# Patient Record
Sex: MALE | Race: WHITE | NOT HISPANIC OR LATINO | Employment: FULL TIME | URBAN - METROPOLITAN AREA
[De-identification: names, ages, dates, MRNs, and addresses within clinical notes are randomized per-mention and may not be internally consistent; named-entity substitution may affect disease eponyms.]

---

## 2018-11-07 ENCOUNTER — TRANSCRIBE ORDERS (OUTPATIENT)
Dept: ADMINISTRATIVE | Facility: HOSPITAL | Age: 33
End: 2018-11-07

## 2018-11-07 DIAGNOSIS — R22.32 MASS OF FINGER OF LEFT HAND: Primary | ICD-10-CM

## 2018-11-07 DIAGNOSIS — G56.22 LESION OF LEFT ULNAR NERVE: ICD-10-CM

## 2018-11-10 ENCOUNTER — HOSPITAL ENCOUNTER (OUTPATIENT)
Dept: ULTRASOUND IMAGING | Facility: HOSPITAL | Age: 33
Discharge: HOME | End: 2018-11-10
Attending: FAMILY MEDICINE
Payer: COMMERCIAL

## 2018-11-10 DIAGNOSIS — R22.32 MASS OF FINGER OF LEFT HAND: ICD-10-CM

## 2018-11-10 DIAGNOSIS — G56.22 LESION OF LEFT ULNAR NERVE: ICD-10-CM

## 2018-11-10 PROCEDURE — 76882 US LMTD JT/FCL EVL NVASC XTR: CPT

## 2020-08-27 ENCOUNTER — OFFICE VISIT (OUTPATIENT)
Dept: URGENT CARE | Facility: CLINIC | Age: 35
End: 2020-08-27
Payer: OTHER MISCELLANEOUS

## 2020-08-27 VITALS
HEIGHT: 71 IN | BODY MASS INDEX: 22.43 KG/M2 | RESPIRATION RATE: 16 BRPM | TEMPERATURE: 96.8 F | SYSTOLIC BLOOD PRESSURE: 108 MMHG | DIASTOLIC BLOOD PRESSURE: 78 MMHG | WEIGHT: 160.2 LBS | OXYGEN SATURATION: 96 % | HEART RATE: 75 BPM
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
## 2020-08-27 PROCEDURE — G0382 LEV 3 HOSP TYPE B ED VISIT: HCPCS | Performed by: FAMILY MEDICINE

## 2020-08-27 PROCEDURE — 99283 EMERGENCY DEPT VISIT LOW MDM: CPT | Performed by: FAMILY MEDICINE

## 2020-08-27 RX ORDER — VENLAFAXINE 25 MG/1
TABLET ORAL EVERY 12 HOURS
COMMUNITY
Start: 2017-04-05

## 2020-08-27 RX ORDER — QUETIAPINE FUMARATE 25 MG/1
TABLET, FILM COATED ORAL
COMMUNITY
Start: 2016-12-28

## 2020-08-27 RX ORDER — BUPROPION HYDROCHLORIDE 150 MG/1
TABLET, EXTENDED RELEASE ORAL
COMMUNITY
Start: 2020-06-05

## 2022-04-19 ENCOUNTER — HOSPITAL ENCOUNTER (EMERGENCY)
Facility: HOSPITAL | Age: 37
End: 2022-04-20
Attending: EMERGENCY MEDICINE | Admitting: EMERGENCY MEDICINE
Payer: COMMERCIAL

## 2022-04-19 DIAGNOSIS — F32.A DEPRESSION: Primary | ICD-10-CM

## 2022-04-19 DIAGNOSIS — R45.851 SUICIDAL IDEATION: ICD-10-CM

## 2022-04-19 LAB
AMPHETAMINES SERPL QL SCN: NEGATIVE
BARBITURATES UR QL: NEGATIVE
BENZODIAZ UR QL: NEGATIVE
COCAINE UR QL: NEGATIVE
ETHANOL EXG-MCNC: 0 MG/DL
FLUAV RNA RESP QL NAA+PROBE: NEGATIVE
FLUBV RNA RESP QL NAA+PROBE: NEGATIVE
METHADONE UR QL: NEGATIVE
OPIATES UR QL SCN: NEGATIVE
OXYCODONE+OXYMORPHONE UR QL SCN: NEGATIVE
PCP UR QL: NEGATIVE
RSV RNA RESP QL NAA+PROBE: NEGATIVE
SARS-COV-2 RNA RESP QL NAA+PROBE: NEGATIVE
THC UR QL: POSITIVE

## 2022-04-19 PROCEDURE — 82075 ASSAY OF BREATH ETHANOL: CPT

## 2022-04-19 PROCEDURE — 0241U HB NFCT DS VIR RESP RNA 4 TRGT: CPT | Performed by: EMERGENCY MEDICINE

## 2022-04-19 PROCEDURE — 80307 DRUG TEST PRSMV CHEM ANLYZR: CPT | Performed by: EMERGENCY MEDICINE

## 2022-04-19 PROCEDURE — 99284 EMERGENCY DEPT VISIT MOD MDM: CPT

## 2022-04-19 PROCEDURE — 99285 EMERGENCY DEPT VISIT HI MDM: CPT

## 2022-04-19 RX ORDER — LORAZEPAM 1 MG/1
1 TABLET ORAL ONCE
Status: COMPLETED | OUTPATIENT
Start: 2022-04-19 | End: 2022-04-19

## 2022-04-19 RX ORDER — ONDANSETRON 4 MG/1
4 TABLET, ORALLY DISINTEGRATING ORAL ONCE
Status: DISCONTINUED | OUTPATIENT
Start: 2022-04-19 | End: 2022-04-19

## 2022-04-19 RX ADMIN — LORAZEPAM 1 MG: 1 TABLET ORAL at 16:08

## 2022-04-19 RX ADMIN — NICOTINE POLACRILEX 2 MG: 2 GUM, CHEWING BUCCAL at 22:00

## 2022-04-19 RX ADMIN — NICOTINE POLACRILEX 2 MG: 2 GUM, CHEWING BUCCAL at 16:12

## 2022-04-19 RX ADMIN — NICOTINE 7 MG: 7 PATCH TRANSDERMAL at 12:48

## 2022-04-19 NOTE — ED NOTES
Crisis met with patient to complete a crisis intake and safety risk assessment  Patient is currently denying SI or a plan but reports continued and increasing thoughts of hopelessness over the last two and half months  Patient does not currently but had a history of SIB including cutting his forearm during a reported "psychotic break" when he was 23 or 20  He reports that he has lost about 30 pounds in about 5 weeks without attempting to and does not sleep more than 2 to 3 hours within a 48 hour time span  Patient appears to be in a manic phase at this time noted by being tangential, flight of ideas, and inability to sit still as he was rock back and forth throughout the assessment  Patient states that he feels as if people are "reading from scripts" and does not always have a concept to time  He has a history of substance use but has been clean since 2012  Patient has a history of being in inpatient treatment at [facility] (x2) in 2011 and 2012  Patient has many psycho stressors occurring currently and feels as if he does not have support as "no one notices me " Patient is currently connected to an OP therapist and psychiatrist at [facility] in [place]  Patient feels as if inpatient treatment is needed at this time to stabilize his mood and is agreeable to sign a 201

## 2022-04-20 VITALS
SYSTOLIC BLOOD PRESSURE: 123 MMHG | RESPIRATION RATE: 18 BRPM | HEART RATE: 73 BPM | OXYGEN SATURATION: 99 % | DIASTOLIC BLOOD PRESSURE: 80 MMHG | TEMPERATURE: 98.2 F

## 2022-04-20 PROCEDURE — 96372 THER/PROPH/DIAG INJ SC/IM: CPT

## 2022-04-20 RX ORDER — HALOPERIDOL 5 MG/ML
5 INJECTION INTRAMUSCULAR ONCE
Status: COMPLETED | OUTPATIENT
Start: 2022-04-20 | End: 2022-04-20

## 2022-04-20 RX ORDER — LORAZEPAM 1 MG/1
1 TABLET ORAL EVERY 6 HOURS PRN
Status: DISCONTINUED | OUTPATIENT
Start: 2022-04-20 | End: 2022-04-20 | Stop reason: HOSPADM

## 2022-04-20 RX ORDER — DIPHENHYDRAMINE HYDROCHLORIDE 50 MG/ML
25 INJECTION INTRAMUSCULAR; INTRAVENOUS ONCE
Status: COMPLETED | OUTPATIENT
Start: 2022-04-20 | End: 2022-04-20

## 2022-04-20 RX ADMIN — NICOTINE POLACRILEX 2 MG: 2 GUM, CHEWING BUCCAL at 00:13

## 2022-04-20 RX ADMIN — LORAZEPAM 1 MG: 1 TABLET ORAL at 08:56

## 2022-04-20 RX ADMIN — NICOTINE POLACRILEX 2 MG: 2 GUM, CHEWING BUCCAL at 06:29

## 2022-04-20 RX ADMIN — HALOPERIDOL LACTATE 5 MG: 5 INJECTION, SOLUTION INTRAMUSCULAR at 00:13

## 2022-04-20 RX ADMIN — NICOTINE POLACRILEX 2 MG: 2 GUM, CHEWING BUCCAL at 02:17

## 2022-04-20 RX ADMIN — DIPHENHYDRAMINE HYDROCHLORIDE 25 MG: 50 INJECTION INTRAMUSCULAR; INTRAVENOUS at 00:13

## 2022-04-20 RX ADMIN — NICOTINE POLACRILEX 2 MG: 2 GUM, CHEWING BUCCAL at 08:13

## 2022-04-20 NOTE — ED NOTES
Patient is accepted at [facility]  Patient is accepted by Jolie Limauth is arranged with [facility]  Transportation is scheduled for **  Patient may go to the floor at after 0800  Nurse report is to be called to ** prior to patient transfer

## 2022-04-20 NOTE — ED NOTES
Spoke with Rosalie Whitehead and Majo from [facility] covid and UDS and then can potentially accept patient  Will call back once completed

## 2022-04-20 NOTE — EMTALA/ACUTE CARE TRANSFER
[facility] EMERGENCY DEPARTMENT  [address]  [facility] [address]  Dept: [phone]      GMZJAS TRANSFER CONSENT    NAME Yoav Sandoval                                         1985                              MRN [id number]    I have been informed of my rights regarding examination, treatment, and transfer   by Dr Sarah Beth Navarrete DO    Benefits: Continuity of care    Risks: Potential for delay in receiving treatment,Potential deterioration of medical condition,Increased discomfort during transfer,Possible worsening of condition or death during transfer      Transfer Request   I acknowledge that my medical condition has been evaluated and explained to me by the emergency department physician or other qualified medical person and/or my attending physician who has recommended and offered to me further medical examination and treatment  I understand the Hospital's obligation with respect to the treatment and stabilization of my emergency medical condition  I nevertheless request to be transferred  I release the Hospital, the doctor, and any other persons caring for me from all responsibility or liability for any injury or ill effects that may result from my transfer and agree to accept all responsibility for the consequences of my choice to transfer, rather than receive stabilizing treatment at the Hospital  I understand that because the transfer is my request, my insurance may not provide reimbursement for the services  The Hospital will assist and direct me and my family in how to make arrangements for transfer, but the hospital is not liable for any fees charged by the transport service  In spite of this understanding, I refuse to consent to further medical examination and treatment which has been offered to me, and request transfer to [facility] Name, [facility] & State : Malik Chapa   I authorize the performance of emergency medical procedures and treatments upon me in both transit and upon arrival at the receiving facility  Additionally, I authorize the release of any and all medical records to the receiving facility and request they be transported with me, if possible  I authorize the performance of emergency medical procedures and treatments upon me in both transit and upon arrival at the receiving facility  Additionally, I authorize the release of any and all medical records to the receiving facility and request they be transported with me, if possible  I understand that the safest mode of transportation during a medical emergency is an ambulance and that the Hospital advocates the use of this mode of transport  Risks of traveling to the receiving facility by car, including absence of medical control, life sustaining equipment, such as oxygen, and medical personnel has been explained to me and I fully understand them  (JEREMIAS CORRECT BOX BELOW)  [  ]  I consent to the stated transfer and to be transported by ambulance/helicopter  [  ]  I consent to the stated transfer, but refuse transportation by ambulance and accept full responsibility for my transportation by car  I understand the risks of non-ambulance transfers and I exonerate the Hospital and its staff from any deterioration in my condition that results from this refusal     X___________________________________________    DATE  22  TIME________  Signature of patient or legally responsible individual signing on patient behalf           RELATIONSHIP TO PATIENT_________________________          Provider Certification    NAME Sonia Chapa                                        [facility] 1985                              MRN [id number]    A medical screening exam was performed on the above named patient  Based on the examination:    Condition Necessitating Transfer The primary encounter diagnosis was Depression  A diagnosis of Suicidal ideation was also pertinent to this visit  Patient Condition:  The patient has been stabilized such that within reasonable medical probability, no material deterioration of the patient condition or the condition of the unborn child(cy) is likely to result from the transfer    Reason for Transfer: Level of Care needed not available at this facility    Transfer Requirements: [facility]   · Space available and qualified personnel available for treatment as acknowledged by    · Agreed to accept transfer and to provide appropriate medical treatment as acknowledged by       Billy Cervantes  · Appropriate medical records of the examination and treatment of the patient are provided at the time of transfer   [facility], [address] _______  · Transfer will be performed by qualified personnel from    and appropriate transfer equipment as required, including the use of necessary and appropriate life support measures  Provider Certification: I have examined the patient and explained the following risks and benefits of being transferred/refusing transfer to the patient/family:         Based on these reasonable risks and benefits to the patient and/or the unborn child(cy), and based upon the information available at the time of the patients examination, I certify that the medical benefits reasonably to be expected from the provision of appropriate medical treatments at another medical facility outweigh the increasing risks, if any, to the individuals medical condition, and in the case of labor to the unborn child, from effecting the transfer      X____________________________________________ DATE 04/20/22        TIME_______      ORIGINAL - SEND TO MEDICAL RECORDS   COPY - SEND WITH PATIENT DURING TRANSFER

## 2022-04-20 NOTE — ED NOTES
PC to Berna CLEMENS, [phone]  Faxed lab results as requested for COVID/UDS  Asked for precert to be done but stated that with this insurance unable to do until accepted  Will CB after information has been received

## 2022-04-20 NOTE — ED NOTES
Crisis met with patient and had patient sign 201  Patient stated that he would prefer inpatient at [facility]  Crisis contacted [facility] and spoke with Feng See  They do have one male bed available  Faxed clinicals and 201 to [facility] admissions

## 2022-04-20 NOTE — ED NOTES
PC to [facility] Berna CALLOWAY, [phone]  Relayed all auth info, let her know that will call with transportation time ([facility] called earlier but have not received a time as of this note)

## 2022-04-20 NOTE — ED NOTES
Follow up with Dr Zhang/Rukhsana in 1 week for results of sleep study.   Insurance Authorization for admission:   Phone call placed to Jason WALKER  Phone number: [phone]     Spoke to Annia Valdovinos     08 days approved  Level of care: Acute Inpatient MH  Review on 4/27/22; review person will be Salena Harada, [phone]   Authorization # [id number]        EVS (Eligibility Verification System) called - [phone]    Automated system indicates: Inactive 04/20/2022

## 2022-04-20 NOTE — ED NOTES
Patient observed as anxious, additional comfort measures provided  MD made aware as well        Viv Blevins, RN  04/19/22 0892

## 2022-04-30 ENCOUNTER — APPOINTMENT (EMERGENCY)
Dept: RADIOLOGY | Facility: HOSPITAL | Age: 37
End: 2022-04-30
Payer: COMMERCIAL

## 2022-04-30 ENCOUNTER — HOSPITAL ENCOUNTER (EMERGENCY)
Facility: HOSPITAL | Age: 37
Discharge: HOME | End: 2022-04-30
Attending: EMERGENCY MEDICINE
Payer: COMMERCIAL

## 2022-04-30 ENCOUNTER — APPOINTMENT (EMERGENCY)
Dept: CT IMAGING | Facility: HOSPITAL | Age: 37
End: 2022-04-30
Payer: COMMERCIAL

## 2022-04-30 VITALS
RESPIRATION RATE: 16 BRPM | OXYGEN SATURATION: 98 % | DIASTOLIC BLOOD PRESSURE: 68 MMHG | HEART RATE: 71 BPM | TEMPERATURE: 98.7 F | SYSTOLIC BLOOD PRESSURE: 106 MMHG

## 2022-04-30 DIAGNOSIS — R20.2 PARESTHESIAS: Primary | ICD-10-CM

## 2022-04-30 DIAGNOSIS — S39.012A BACK STRAIN, INITIAL ENCOUNTER: ICD-10-CM

## 2022-04-30 LAB — GLUCOSE SERPL-MCNC: 98 MG/DL (ref 65–140)

## 2022-04-30 PROCEDURE — 99285 EMERGENCY DEPT VISIT HI MDM: CPT

## 2022-04-30 PROCEDURE — 99285 EMERGENCY DEPT VISIT HI MDM: CPT | Performed by: EMERGENCY MEDICINE

## 2022-04-30 PROCEDURE — 93005 ELECTROCARDIOGRAM TRACING: CPT

## 2022-04-30 PROCEDURE — 82948 REAGENT STRIP/BLOOD GLUCOSE: CPT

## 2022-04-30 PROCEDURE — 70450 CT HEAD/BRAIN W/O DYE: CPT

## 2022-04-30 PROCEDURE — G1004 CDSM NDSC: HCPCS

## 2022-04-30 PROCEDURE — 72070 X-RAY EXAM THORAC SPINE 2VWS: CPT

## 2022-04-30 RX ORDER — LORAZEPAM 1 MG/1
1 TABLET ORAL ONCE
Status: COMPLETED | OUTPATIENT
Start: 2022-04-30 | End: 2022-04-30

## 2022-04-30 RX ADMIN — LORAZEPAM 1 MG: 1 TABLET ORAL at 02:53

## 2022-04-30 NOTE — ED PROVIDER NOTES
History  Chief Complaint   Patient presents with    Numbness     38 yo M with PMH of anxiety, depression presents to ED with neck "pressure" without trauma or injury (weeks) and numbness in entire left foot, which has since resolved  The numbness had been present for 6-12 hours PTA  No similar in past, and no other deficits or complaints  He ambulated to room without difficulty  He does heavy lifting for work, but no specific trauma he can think of  No drug use or fevers  No bowel/bladder incontinence  No steroids  No red flags  History provided by:  Patient and medical records   used: No    Neurologic Problem  Onset quality:  Gradual  Timing:  Constant  Progression:  Resolved  Chronicity:  New  Associated symptoms: myalgias    Associated symptoms: no abdominal pain, no chest pain, no congestion, no cough, no diarrhea, no ear pain, no fatigue, no fever, no headaches, no loss of consciousness, no nausea, no rash, no rhinorrhea, no shortness of breath, no sore throat, no vomiting and no wheezing        Prior to Admission Medications   Prescriptions Last Dose Informant Patient Reported? Taking? QUEtiapine (SEROquel) 25 mg tablet   Yes No   Si tablet   buPROPion (ZYBAN) 150 MG 12 hr tablet   Yes No   Sig: take 1 tablet by mouth every morning for 3 days then INCREASE to      (REFER TO PRESCRIPTION NOTES)  venlafaxine (EFFEXOR) 25 mg tablet   Yes No   Sig: Every 12 hours      Facility-Administered Medications: None       Past Medical History:   Diagnosis Date    Anxiety     Asthma     Depression        Past Surgical History:   Procedure Laterality Date    CHEST WALL RECONSTRUCTION      WISDOM TOOTH EXTRACTION         History reviewed  No pertinent family history  I have reviewed and agree with the history as documented      E-Cigarette/Vaping     E-Cigarette/Vaping Substances     Social History     Tobacco Use    Smoking status: Current Every Day Smoker     Packs/day: 3 00    Smokeless tobacco: Never Used   Substance Use Topics    Alcohol use: Never    Drug use: Yes     Types: Marijuana       Review of Systems   Constitutional: Negative for chills, diaphoresis, fatigue, fever and unexpected weight change  HENT: Negative for congestion, ear pain, rhinorrhea, sore throat, trouble swallowing and voice change  Eyes: Negative for pain and visual disturbance  Respiratory: Negative for cough, chest tightness, shortness of breath and wheezing  Cardiovascular: Negative for chest pain, palpitations and leg swelling  Gastrointestinal: Negative for abdominal pain, blood in stool, constipation, diarrhea, nausea and vomiting  Genitourinary: Negative for difficulty urinating and hematuria  Musculoskeletal: Positive for myalgias  Negative for arthralgias, back pain and neck pain  Skin: Negative for rash  Neurological: Positive for numbness  Negative for dizziness, loss of consciousness, syncope, light-headedness and headaches  Psychiatric/Behavioral: Negative for confusion and suicidal ideas  The patient is not nervous/anxious  Physical Exam  Physical Exam  Vitals and nursing note reviewed  Constitutional:       General: He is not in acute distress  Appearance: He is well-developed and underweight  He is not ill-appearing, toxic-appearing or diaphoretic  HENT:      Head: Normocephalic and atraumatic  Right Ear: External ear normal       Left Ear: External ear normal       Nose: Nose normal       Mouth/Throat:      Mouth: Mucous membranes are moist       Pharynx: No oropharyngeal exudate or posterior oropharyngeal erythema  Eyes:      General: No scleral icterus  Right eye: No discharge  Left eye: No discharge  Extraocular Movements: Extraocular movements intact  Conjunctiva/sclera: Conjunctivae normal       Pupils: Pupils are equal, round, and reactive to light  Neck:      Vascular: No JVD  Trachea: No tracheal deviation  Cardiovascular:      Rate and Rhythm: Normal rate and regular rhythm  Pulses: Normal pulses  Heart sounds: Normal heart sounds  No murmur heard  No friction rub  No gallop  Pulmonary:      Effort: Pulmonary effort is normal  No respiratory distress  Breath sounds: Normal breath sounds  No stridor  No wheezing or rales  Chest:      Chest wall: No tenderness  Abdominal:      General: Bowel sounds are normal  There is no distension  Palpations: Abdomen is soft  Tenderness: There is no abdominal tenderness  There is no guarding or rebound  Musculoskeletal:         General: No swelling or deformity  Normal range of motion  Cervical back: Normal, normal range of motion and neck supple  No rigidity or tenderness  Thoracic back: Tenderness present  No swelling, edema, deformity, signs of trauma, lacerations, spasms or bony tenderness  Normal range of motion  Scoliosis present  Lumbar back: Normal         Back:       Right lower leg: No edema  Left lower leg: No edema  Lymphadenopathy:      Cervical: No cervical adenopathy  Skin:     General: Skin is warm and dry  Capillary Refill: Capillary refill takes less than 2 seconds  Coloration: Skin is pale  Findings: No rash  Neurological:      General: No focal deficit present  Mental Status: He is alert and oriented to person, place, and time  GCS: GCS eye subscore is 4  GCS verbal subscore is 5  GCS motor subscore is 6  Cranial Nerves: Cranial nerves are intact  No cranial nerve deficit  Sensory: Sensation is intact  No sensory deficit  Motor: Motor function is intact  No weakness  Coordination: Coordination normal    Psychiatric:         Attention and Perception: Attention normal          Mood and Affect: Mood is anxious and depressed           Behavior: Behavior normal          Vital Signs  ED Triage Vitals [04/30/22 0220]   Temperature Pulse Respirations Blood Pressure SpO2   98 7 °F (37 1 °C) 71 16 106/68 98 %      Temp Source Heart Rate Source Patient Position - Orthostatic VS BP Location FiO2 (%)   Temporal Monitor Lying Right arm --      Pain Score       --           Vitals:    04/30/22 0220   BP: 106/68   Pulse: 71   Patient Position - Orthostatic VS: Lying         Visual Acuity      ED Medications  Medications   LORazepam (ATIVAN) tablet 1 mg (1 mg Oral Given 4/30/22 0253)       Diagnostic Studies  Results Reviewed     Procedure Component Value Units Date/Time    Fingerstick Glucose (POCT) [[id number]]  (Normal) Collected: 04/30/22 0216    Lab Status: Final result Updated: 04/30/22 0217     POC Glucose 98 mg/dl                  CT head without contrast   Final Result by Paloma Perkins MD (04/30 0321)      No acute intracranial hemorrhage, midline shift, or mass effect  Workstation performed: [id number]         XR spine thoracic 2 views   ED Interpretation by Valentina Encarnacion MD (04/30 8267)   Scoliosis, no other acute abnormality                   Procedures  ECG 12 Lead Documentation Only    Date/Time: 4/30/2022 4:55 AM  Performed by: Valentina Encarnacion MD  Authorized by: Valentina Encarnacion MD     Indications / Diagnosis:  Paresthesias  ECG reviewed by me, the ED Provider: yes    Patient location:  ED  Previous ECG:     Previous ECG:  Unavailable    Comparison to cardiac monitor: Yes    Interpretation:     Interpretation: non-specific    Rate:     ECG rate:  74    ECG rate assessment: normal    Rhythm:     Rhythm: sinus rhythm    Ectopy:     Ectopy: none    QRS:     QRS axis:  Right    QRS intervals:  Normal  Conduction:     Conduction: normal    ST segments:     ST segments:  Normal  T waves:     T waves: non-specific               ED Course                                             MDM  Number of Diagnoses or Management Options  Back strain, initial encounter: new and requires workup  Paresthesias: new and requires workup     Amount and/or Complexity of Data Reviewed  Tests in the radiology section of CPT®: ordered and reviewed  Review and summarize past medical records: yes  Independent visualization of images, tracings, or specimens: yes    Risk of Complications, Morbidity, and/or Mortality  Presenting problems: moderate  Diagnostic procedures: low  Management options: low  General comments: Pt felt improved after ativan  Neck "pressure" ongoing since sleeping in a recliner for 2 months  No CP/SOB or fevers/cough  No focal neuro sx on initial or repeat exam  Ambulated into ED  Discussed supportive care for likely muscle strain in neck/back, and PCP f/u for numbness  If sx change or worsen - RTED  Is going to find a ride home tonight  Patient Progress  Patient progress: improved      Disposition  Final diagnoses:   Paresthesias   Back strain, initial encounter     Time reflects when diagnosis was documented in both MDM as applicable and the Disposition within this note     Time User Action Codes Description Comment    4/30/2022  3:22 AM Serg VALLES Add [R20 2] Paresthesias     4/30/2022  3:23 AM Margarette Goldberg Add [S39 012A] Back strain, initial encounter       ED Disposition     ED Disposition Condition Date/Time Comment    Discharge Stable Sat Apr 30, 2022  3:22 AM Savanna Arellano discharge to home/self care              Follow-up Information     Follow up With Specialties Details Why Contact Info Additional Vinh, DO Family Medicine Schedule an appointment as soon as possible for a visit   [address] Emergency Department Emergency Medicine  If symptoms worsen [address] [address]  [phone] [address] Emergency Department, [address] [place] [address] [address]          Discharge Medication List as of 4/30/2022  3:23 AM      CONTINUE these medications which have NOT CHANGED    Details   buPROPion (ZYBAN) 150 MG 12 hr tablet take 1 tablet by mouth every morning for 3 days then INCREASE to      (REFER TO PRESCRIPTION NOTES)  , Historical Med      QUEtiapine (SEROquel) 25 mg tablet 1 tablet, Historical Med      venlafaxine (EFFEXOR) 25 mg tablet Every 12 hours, Starting Wed 4/5/2017, Historical Med             No discharge procedures on file      PDMP Review     None          ED Provider  Electronically Signed by           Kike Gillette MD  04/30/22 5757

## 2022-04-30 NOTE — ED TRIAGE NOTES
Pt arrives ambulatory to the ED from home with multiple complaints  Pt reporting numbness to L  Foot and "neck pressure" that started ~ 12 hours ago, "swelling to bottom of R  Shoulder blade" that started a couple weeks ago, and difficulty remembering things x 2 5 months  Pt AO x 4, appears in NAD

## 2022-05-03 LAB
ATRIAL RATE: 74 BPM
P AXIS: 81 DEGREES
PR INTERVAL: 144 MS
QRS AXIS: 92 DEGREES
QRSD INTERVAL: 92 MS
QT INTERVAL: 400 MS
QTC INTERVAL: 444 MS
T WAVE AXIS: 61 DEGREES
VENTRICULAR RATE: 74 BPM

## 2022-05-03 PROCEDURE — 93010 ELECTROCARDIOGRAM REPORT: CPT | Performed by: INTERNAL MEDICINE

## 2023-01-18 ENCOUNTER — HOSPITAL ENCOUNTER (EMERGENCY)
Facility: HOSPITAL | Age: 38
Discharge: HOME | End: 2023-01-19

## 2023-01-18 LAB
ALBUMIN SERPL BCP-MCNC: 3.6 G/DL (ref 3.5–5)
ALP SERPL-CCNC: 104 U/L (ref 46–116)
ALT SERPL W P-5'-P-CCNC: 25 U/L (ref 12–78)
ANION GAP SERPL CALCULATED.3IONS-SCNC: 7 MMOL/L (ref 4–13)
AST SERPL W P-5'-P-CCNC: 16 U/L (ref 5–45)
BASOPHILS # BLD AUTO: 0.06 THOUSANDS/ÂΜL (ref 0–0.1)
BASOPHILS NFR BLD AUTO: 1 % (ref 0–1)
BILIRUB SERPL-MCNC: 0.3 MG/DL (ref 0.2–1)
BUN SERPL-MCNC: 15 MG/DL (ref 5–25)
CALCIUM SERPL-MCNC: 8.8 MG/DL (ref 8.3–10.1)
CHLORIDE SERPL-SCNC: 104 MMOL/L (ref 96–108)
CO2 SERPL-SCNC: 30 MMOL/L (ref 21–32)
CREAT SERPL-MCNC: 1.01 MG/DL (ref 0.6–1.3)
EOSINOPHIL # BLD AUTO: 0.38 THOUSAND/ÂΜL (ref 0–0.61)
EOSINOPHIL NFR BLD AUTO: 3 % (ref 0–6)
ERYTHROCYTE [DISTWIDTH] IN BLOOD BY AUTOMATED COUNT: 11.7 % (ref 11.6–15.1)
GFR SERPL CREATININE-BSD FRML MDRD: 94 ML/MIN/1.73SQ M
GLUCOSE SERPL-MCNC: 111 MG/DL (ref 65–140)
HCT VFR BLD AUTO: 39.6 % (ref 36.5–49.3)
HGB BLD-MCNC: 13.2 G/DL (ref 12–17)
IMM GRANULOCYTES # BLD AUTO: 0.03 THOUSAND/UL (ref 0–0.2)
IMM GRANULOCYTES NFR BLD AUTO: 0 % (ref 0–2)
LIPASE SERPL-CCNC: 95 U/L (ref 73–393)
LYMPHOCYTES # BLD AUTO: 2.81 THOUSANDS/ÂΜL (ref 0.6–4.47)
LYMPHOCYTES NFR BLD AUTO: 23 % (ref 14–44)
MCH RBC QN AUTO: 31.7 PG (ref 26.8–34.3)
MCHC RBC AUTO-ENTMCNC: 33.3 G/DL (ref 31.4–37.4)
MCV RBC AUTO: 95 FL (ref 82–98)
MONOCYTES # BLD AUTO: 1.31 THOUSAND/ÂΜL (ref 0.17–1.22)
MONOCYTES NFR BLD AUTO: 11 % (ref 4–12)
NEUTROPHILS # BLD AUTO: 7.77 THOUSANDS/ÂΜL (ref 1.85–7.62)
NEUTS SEG NFR BLD AUTO: 62 % (ref 43–75)
NRBC BLD AUTO-RTO: 0 /100 WBCS
PLATELET # BLD AUTO: 280 THOUSANDS/UL (ref 149–390)
PMV BLD AUTO: 9.4 FL (ref 8.9–12.7)
POTASSIUM SERPL-SCNC: 3.9 MMOL/L (ref 3.5–5.3)
PROT SERPL-MCNC: 7.3 G/DL (ref 6.4–8.4)
RBC # BLD AUTO: 4.16 MILLION/UL (ref 3.88–5.62)
SODIUM SERPL-SCNC: 141 MMOL/L (ref 135–147)
WBC # BLD AUTO: 12.36 THOUSAND/UL (ref 4.31–10.16)

## 2023-01-18 RX ORDER — IBUPROFEN 600 MG/1
600 TABLET ORAL ONCE
Status: COMPLETED | OUTPATIENT
Start: 2023-01-18 | End: 2023-01-18

## 2023-01-18 RX ORDER — ACETAMINOPHEN 325 MG/1
650 TABLET ORAL ONCE
Status: COMPLETED | OUTPATIENT
Start: 2023-01-18 | End: 2023-01-18

## 2023-01-18 RX ADMIN — ACETAMINOPHEN 650 MG: 325 TABLET ORAL at 20:50

## 2023-01-18 RX ADMIN — IBUPROFEN 600 MG: 200 TABLET, FILM COATED ORAL at 20:50

## 2023-01-19 VITALS
DIASTOLIC BLOOD PRESSURE: 55 MMHG | RESPIRATION RATE: 16 BRPM | SYSTOLIC BLOOD PRESSURE: 109 MMHG | HEART RATE: 65 BPM | OXYGEN SATURATION: 99 %

## 2023-01-19 NOTE — ED NOTES
Pt sound asleep in waiting room, RN had to wake pt up to update VS in triage  VSS, pt states pain still 6/10  Pt updated on plan of care and back out to waiting room       Claude Park RN  01/19/23 2567

## 2023-03-20 ENCOUNTER — HOSPITAL ENCOUNTER (EMERGENCY)
Facility: HOSPITAL | Age: 38
Discharge: HOME | End: 2023-03-20
Attending: EMERGENCY MEDICINE

## 2023-03-20 ENCOUNTER — APPOINTMENT (EMERGENCY)
Dept: CT IMAGING | Facility: HOSPITAL | Age: 38
End: 2023-03-20

## 2023-03-20 VITALS
BODY MASS INDEX: 17.61 KG/M2 | DIASTOLIC BLOOD PRESSURE: 63 MMHG | TEMPERATURE: 97.6 F | OXYGEN SATURATION: 99 % | RESPIRATION RATE: 19 BRPM | WEIGHT: 124.5 LBS | SYSTOLIC BLOOD PRESSURE: 123 MMHG | HEART RATE: 82 BPM

## 2023-03-20 DIAGNOSIS — R07.89 ATYPICAL CHEST PAIN: Primary | ICD-10-CM

## 2023-03-20 DIAGNOSIS — R91.1 PULMONARY NODULE: ICD-10-CM

## 2023-03-20 LAB
ALBUMIN SERPL BCP-MCNC: 4.3 G/DL (ref 3.5–5)
ALP SERPL-CCNC: 76 U/L (ref 34–104)
ALT SERPL W P-5'-P-CCNC: 14 U/L (ref 7–52)
ANION GAP SERPL CALCULATED.3IONS-SCNC: 6 MMOL/L (ref 4–13)
AST SERPL W P-5'-P-CCNC: 16 U/L (ref 13–39)
ATRIAL RATE: 61 BPM
BACTERIA UR QL AUTO: NORMAL /HPF
BASOPHILS # BLD AUTO: 0.06 THOUSANDS/ÂΜL (ref 0–0.1)
BASOPHILS NFR BLD AUTO: 1 % (ref 0–1)
BILIRUB SERPL-MCNC: 0.62 MG/DL (ref 0.2–1)
BILIRUB UR QL STRIP: NEGATIVE
BNP SERPL-MCNC: 10 PG/ML (ref 0–100)
BUN SERPL-MCNC: 19 MG/DL (ref 5–25)
CALCIUM SERPL-MCNC: 9.1 MG/DL (ref 8.4–10.2)
CARDIAC TROPONIN I PNL SERPL HS: 2 NG/L
CHLORIDE SERPL-SCNC: 105 MMOL/L (ref 96–108)
CLARITY UR: CLEAR
CO2 SERPL-SCNC: 28 MMOL/L (ref 21–32)
COLOR UR: YELLOW
CREAT SERPL-MCNC: 0.89 MG/DL (ref 0.6–1.3)
EOSINOPHIL # BLD AUTO: 0.22 THOUSAND/ÂΜL (ref 0–0.61)
EOSINOPHIL NFR BLD AUTO: 3 % (ref 0–6)
ERYTHROCYTE [DISTWIDTH] IN BLOOD BY AUTOMATED COUNT: 11.2 % (ref 11.6–15.1)
GFR SERPL CREATININE-BSD FRML MDRD: 109 ML/MIN/1.73SQ M
GLUCOSE SERPL-MCNC: 87 MG/DL (ref 65–140)
GLUCOSE UR STRIP-MCNC: NEGATIVE MG/DL
HCT VFR BLD AUTO: 39.9 % (ref 36.5–49.3)
HGB BLD-MCNC: 13.7 G/DL (ref 12–17)
HGB UR QL STRIP.AUTO: NEGATIVE
IMM GRANULOCYTES # BLD AUTO: 0.02 THOUSAND/UL (ref 0–0.2)
IMM GRANULOCYTES NFR BLD AUTO: 0 % (ref 0–2)
KETONES UR STRIP-MCNC: NEGATIVE MG/DL
LEUKOCYTE ESTERASE UR QL STRIP: NEGATIVE
LIPASE SERPL-CCNC: 22 U/L (ref 11–82)
LYMPHOCYTES # BLD AUTO: 2.61 THOUSANDS/ÂΜL (ref 0.6–4.47)
LYMPHOCYTES NFR BLD AUTO: 39 % (ref 14–44)
MCH RBC QN AUTO: 31.4 PG (ref 26.8–34.3)
MCHC RBC AUTO-ENTMCNC: 34.3 G/DL (ref 31.4–37.4)
MCV RBC AUTO: 91 FL (ref 82–98)
MONOCYTES # BLD AUTO: 0.74 THOUSAND/ÂΜL (ref 0.17–1.22)
MONOCYTES NFR BLD AUTO: 11 % (ref 4–12)
NEUTROPHILS # BLD AUTO: 3.03 THOUSANDS/ÂΜL (ref 1.85–7.62)
NEUTS SEG NFR BLD AUTO: 46 % (ref 43–75)
NITRITE UR QL STRIP: NEGATIVE
NON-SQ EPI CELLS URNS QL MICRO: NORMAL /HPF
NRBC BLD AUTO-RTO: 0 /100 WBCS
P AXIS: 81 DEGREES
PH UR STRIP.AUTO: 6.5 [PH]
PLATELET # BLD AUTO: 285 THOUSANDS/UL (ref 149–390)
PMV BLD AUTO: 9.7 FL (ref 8.9–12.7)
POTASSIUM SERPL-SCNC: 3.6 MMOL/L (ref 3.5–5.3)
PR INTERVAL: 146 MS
PROT SERPL-MCNC: 6.9 G/DL (ref 6.4–8.4)
PROT UR STRIP-MCNC: ABNORMAL MG/DL
QRS AXIS: 93 DEGREES
QRSD INTERVAL: 94 MS
QT INTERVAL: 376 MS
QTC INTERVAL: 378 MS
RBC # BLD AUTO: 4.37 MILLION/UL (ref 3.88–5.62)
RBC #/AREA URNS AUTO: NORMAL /HPF
SODIUM SERPL-SCNC: 139 MMOL/L (ref 135–147)
SP GR UR STRIP.AUTO: 1.01 (ref 1–1.03)
T WAVE AXIS: 70 DEGREES
UROBILINOGEN UR STRIP-ACNC: <2 MG/DL
VENTRICULAR RATE: 61 BPM
WBC # BLD AUTO: 6.68 THOUSAND/UL (ref 4.31–10.16)
WBC #/AREA URNS AUTO: NORMAL /HPF

## 2023-03-20 RX ADMIN — SODIUM CHLORIDE 1000 ML: 0.9 INJECTION, SOLUTION INTRAVENOUS at 09:58

## 2023-03-20 RX ADMIN — IOHEXOL 100 ML: 350 INJECTION, SOLUTION INTRAVENOUS at 11:07

## 2023-03-20 NOTE — ED PROVIDER NOTES
History  Chief Complaint   Patient presents with   • Chest Pain     Pt to er with upper left chest pain that has been going on for a couple days  States he was just in the hospital the other day for this, and that he was waiting to hear from a cardiologist  Was told that "something is wrong with his heart and abdomen"     This is a 26-year-old male who states that he has had left-sided chest pain for at least 1 year  Has been seen at numerous facilities  Patient states that he recently had a CAT scan of his chest for a pulmonary nodule and was told at that time that there were some kind of blockage in his abdomen and he was supposed to follow-up with a cardiologist   He is a smoker denies any current treatment for hypertension diabetes or hypercholesterolemia did have reconstructive chest surgery at age 12 for pectus excavatum  Villegas Stephanie History provided by:  Patient  Medical Problem  Location:  Left chest  Quality:  Achy pain  Severity:  Moderate  Onset quality:  Unable to specify  Duration:  12 months  Progression:  Waxing and waning  Chronicity:  Chronic  Context:  Chronic recurrent left chest pain  Worsened by:  Palpation  Associated symptoms: abdominal pain and chest pain        Prior to Admission Medications   Prescriptions Last Dose Informant Patient Reported? Taking? QUEtiapine (SEROquel) 25 mg tablet   Yes No   Si tablet   buPROPion (ZYBAN) 150 MG 12 hr tablet   Yes No   Sig: take 1 tablet by mouth every morning for 3 days then INCREASE to      (REFER TO PRESCRIPTION NOTES)  venlafaxine (EFFEXOR) 25 mg tablet   Yes No   Sig: Every 12 hours      Facility-Administered Medications: None       Past Medical History:   Diagnosis Date   • Anxiety    • Asthma    • Depression        Past Surgical History:   Procedure Laterality Date   • CHEST WALL RECONSTRUCTION     • WISDOM TOOTH EXTRACTION         History reviewed  No pertinent family history    I have reviewed and agree with the history as documented  E-Cigarette/Vaping   • E-Cigarette Use Never User      E-Cigarette/Vaping Substances     Social History     Tobacco Use   • Smoking status: Every Day     Packs/day: 1 00     Types: Cigarettes   • Smokeless tobacco: Never   Vaping Use   • Vaping Use: Never used   Substance Use Topics   • Alcohol use: Never   • Drug use: Yes     Types: Marijuana       Review of Systems   Cardiovascular: Positive for chest pain  Gastrointestinal: Positive for abdominal pain  All other systems reviewed and are negative  Physical Exam  Physical Exam  Vitals and nursing note reviewed  Constitutional:       General: He is not in acute distress  Appearance: He is not ill-appearing, toxic-appearing or diaphoretic  Comments: Frail and thin   HENT:      Head: Normocephalic  Right Ear: Tympanic membrane, ear canal and external ear normal       Left Ear: Tympanic membrane, ear canal and external ear normal    Eyes:      General: No scleral icterus  Right eye: No discharge  Left eye: No discharge  Extraocular Movements: Extraocular movements intact  Pupils: Pupils are equal, round, and reactive to light  Cardiovascular:      Rate and Rhythm: Normal rate and regular rhythm  Pulses: Normal pulses  Heart sounds: No murmur heard  No friction rub  No gallop  Pulmonary:      Effort: No respiratory distress  Breath sounds: No stridor  No wheezing, rhonchi or rales  Comments: Pectus excavatum repair  Chest:      Chest wall: Tenderness present  Abdominal:      General: There is no distension  Palpations: Abdomen is soft  Tenderness: There is no abdominal tenderness  There is no guarding or rebound  Musculoskeletal:         General: No swelling, tenderness, deformity or signs of injury  Normal range of motion  Cervical back: Normal range of motion and neck supple  No tenderness  Right lower leg: No edema  Left lower leg: No edema  Skin:     General: Skin is warm and dry  Coloration: Skin is not jaundiced  Findings: No bruising, erythema or rash  Neurological:      General: No focal deficit present  Mental Status: He is alert and oriented to person, place, and time  Cranial Nerves: No cranial nerve deficit  Sensory: No sensory deficit  Coordination: Coordination normal       Gait: Gait normal    Psychiatric:         Mood and Affect: Mood normal          Behavior: Behavior normal          Thought Content: Thought content normal          Vital Signs  ED Triage Vitals [03/20/23 0858]   Temperature Pulse Respirations Blood Pressure SpO2   97 6 °F (36 4 °C) 100 18 98/66 99 %      Temp Source Heart Rate Source Patient Position - Orthostatic VS BP Location FiO2 (%)   Temporal Monitor Sitting Left arm --      Pain Score       --           Vitals:    03/20/23 1145 03/20/23 1200 03/20/23 1215 03/20/23 1237   BP:    123/63   Pulse: 71 74 75 77   Patient Position - Orthostatic VS:             Visual Acuity      ED Medications  Medications   sodium chloride 0 9 % bolus 1,000 mL (0 mL Intravenous Stopped 3/20/23 1232)   iohexol (OMNIPAQUE) 350 MG/ML injection (SINGLE-DOSE) 100 mL (100 mL Intravenous Given 3/20/23 1107)       Diagnostic Studies  Results Reviewed     Procedure Component Value Units Date/Time    Urine Microscopic [[id number]] Collected: 03/20/23 1237    Lab Status: In process Specimen: Urine, Clean Catch Updated: 03/20/23 1245    UA w Reflex to Microscopic w Reflex to Culture [[id number]] Collected: 03/20/23 1237    Lab Status:  In process Specimen: Urine, Clean Catch Updated: 03/20/23 1240    B-Type Natriuretic Peptide(BNP) [[id number]]  (Normal) Collected: 03/20/23 0954    Lab Status: Final result Specimen: Blood from Arm, Left Updated: 03/20/23 1111     BNP 10 pg/mL     HS Troponin 0hr (reflex protocol) [[id number]]  (Normal) Collected: 03/20/23 0954    Lab Status: Final result Specimen: Blood from Arm, Left Updated: 03/20/23 1030     hs TnI 0hr 2 ng/L     Lipase [[id number]]  (Normal) Collected: 03/20/23 0954    Lab Status: Final result Specimen: Blood from Arm, Left Updated: 03/20/23 1022     Lipase 22 u/L     Comprehensive metabolic panel [[id number]] Collected: 03/20/23 0954    Lab Status: Final result Specimen: Blood from Arm, Left Updated: 03/20/23 1022     Sodium 139 mmol/L      Potassium 3 6 mmol/L      Chloride 105 mmol/L      CO2 28 mmol/L      ANION GAP 6 mmol/L      BUN 19 mg/dL      Creatinine 0 89 mg/dL      Glucose 87 mg/dL      Calcium 9 1 mg/dL      AST 16 U/L      ALT 14 U/L      Alkaline Phosphatase 76 U/L      Total Protein 6 9 g/dL      Albumin 4 3 g/dL      Total Bilirubin 0 62 mg/dL      eGFR 109 ml/min/1 73sq m     Narrative:      [place] guidelines for Chronic Kidney Disease (CKD):   •  Stage 1 with normal or high GFR (GFR > 90 mL/min/1 73 square meters)  •  Stage 2 Mild CKD (GFR = 60-89 mL/min/1 73 square meters)  •  Stage 3A Moderate CKD (GFR = 45-59 mL/min/1 73 square meters)  •  Stage 3B Moderate CKD (GFR = 30-44 mL/min/1 73 square meters)  •  Stage 4 Severe CKD (GFR = 15-29 mL/min/1 73 square meters)  •  Stage 5 End Stage CKD (GFR <15 mL/min/1 73 square meters)  Note: GFR calculation is accurate only with a steady state creatinine    CBC and differential [[id number]]  (Abnormal) Collected: 03/20/23 0954    Lab Status: Final result Specimen: Blood from Arm, Left Updated: 03/20/23 1007     WBC 6 68 Thousand/uL      RBC 4 37 Million/uL      Hemoglobin 13 7 g/dL      Hematocrit 39 9 %      MCV 91 fL      MCH 31 4 pg      MCHC 34 3 g/dL      RDW 11 2 %      MPV 9 7 fL      Platelets 421 Thousands/uL      nRBC 0 /100 WBCs      Neutrophils Relative 46 %      Immat GRANS % 0 %      Lymphocytes Relative 39 %      Monocytes Relative 11 %      Eosinophils Relative 3 %      Basophils Relative 1 %      Neutrophils Absolute 3 03 Thousands/µL      Immature Grans Absolute 0 02 Thousand/uL      Lymphocytes Absolute 2 61 Thousands/µL      Monocytes Absolute 0 74 Thousand/µL      Eosinophils Absolute 0 22 Thousand/µL      Basophils Absolute 0 06 Thousands/µL                  CT chest abdomen pelvis w contrast   Final Result by Ruslan Cuevas MD (03/20 1213)      No acute CT findings  5 mm pulmonary nodule right lower lobe  If the patient is at increased risk for malignancy, recommend short-term follow-up in 12 months  Additional findings as above  Workstation performed: [id number]                    Procedures  ECG 12 Lead Documentation Only    Date/Time: 3/20/2023 9:52 AM  Performed by: Pearl Bryant DO  Authorized by: Pearl Bryant DO     ECG reviewed by me, the ED Provider: yes    Patient location:  ED  Rate:     ECG rate:  61  Rhythm:     Rhythm: sinus rhythm    Conduction:     Conduction: normal    T waves:     T waves: normal               ED Course             HEART Risk Score    Flowsheet Row Most Recent Value   Heart Score Risk Calculator    History 0 Filed at: 03/20/2023 1133   ECG 0 Filed at: 03/20/2023 1133   Age 0 Filed at: 03/20/2023 1133   Risk Factors 1 Filed at: 03/20/2023 1133   Troponin 0 Filed at: 03/20/2023 1133   HEART Score 1 Filed at: 03/20/2023 1133                        SBIRT 22yo+    Flowsheet Row Most Recent Value   SBIRT (23 yo +)    In order to provide better care to our patients, we are screening all of our patients for alcohol and drug use  Would it be okay to ask you these screening questions? Yes Filed at: 03/20/2023 5202   Initial Alcohol Screen: US AUDIT-C     1  How often do you have a drink containing alcohol? 0 Filed at: 03/20/2023 0949   2  How many drinks containing alcohol do you have on a typical day you are drinking? 0 Filed at: 03/20/2023 0949   3a  Male UNDER 65: How often do you have five or more drinks on one occasion? 0 Filed at: 03/20/2023 0949   3b  FEMALE Any Age, or MALE 65+:  How often do you have 4 or more drinks on one occassion? 0 Filed at: 03/20/2023 0949   Audit-C Score 0 Filed at: 03/20/2023 7385   RASHARD: How many times in the past year have you    Used an illegal drug or used a prescription medication for non-medical reasons? Never Filed at: 03/20/2023 4517                    Medical Decision Making  Chest pain differential includes acute coronary syndrome versus pneumonia pneumothorax or musculoskeletal pain also abdominal pain and questionable vascular blockage by report of patient will check CAT scan of the chest abdomen and pelvis    Amount and/or Complexity of Data Reviewed  Labs: ordered  Radiology: ordered  Disposition  Final diagnoses:   Atypical chest pain   Pulmonary nodule - Patient was informed to have repeat chest x-ray in 12 months to rule out any cancerous changes     Time reflects when diagnosis was documented in both MDM as applicable and the Disposition within this note     Time User Action Codes Description Comment    3/20/2023 12:49 PM Surekha Poe Add [R07 89] Atypical chest pain     3/20/2023 12:49 PM Darlene Hansen [R91 1] Pulmonary nodule     3/20/2023 12:49 PM Miguel A Baptiste [R91 1] Pulmonary nodule Patient was informed to have repeat chest x-ray in 12 months to rule out any cancerous changes      ED Disposition     ED Disposition   Discharge    Condition   Stable    Date/Time   Mon Mar 20, 2023 12:49 PM    Comment   Celio Ortiz discharge to home/self care  Follow-up Information     Follow up With Specialties Details Why [address], DO Family Medicine In 1 week  [facility] [address]  [phone]            Patient's Medications   Discharge Prescriptions    No medications on file       No discharge procedures on file      PDMP Review     None          ED Provider  Electronically Signed by           Karley Plascencia DO  03/20/23 3866

## 2023-03-20 NOTE — DISCHARGE INSTRUCTIONS
Have repeat CAT scan of your chest done in 12 months to further evaluate pulmonary nodule seen today to rule out any cancerous changes

## 2023-09-14 ENCOUNTER — NURSE TRIAGE (OUTPATIENT)
Age: 38
End: 2023-09-14

## 2023-09-14 NOTE — TELEPHONE ENCOUNTER
Regarding: Difficulty Urinating  ----- Message from Ricky Sanon sent at 9/14/2023  4:22 PM EDT -----  Nw Pt calling with difficulty urinating. PT states he feels there is something blocking his urine. Pt is going to [facility] 9/15/23 for Bloodwork. Decision tree says to schedule next available appt.     Pt  [phone]

## 2023-09-14 NOTE — TELEPHONE ENCOUNTER
Pt reports trouble with urination for the past 4-6 months. Also trouble with ejaculation  . Appt scheduled for next week. Pt denies blood in urine fever or chills.    Reason for Disposition  • All other males with painful urination, or patient wants to be seen    Protocols used: URINATION PAIN - MALE-ADULT-[place]

## 2023-09-22 ENCOUNTER — OFFICE VISIT (OUTPATIENT)
Dept: UROLOGY | Facility: CLINIC | Age: 38
End: 2023-09-22
Payer: COMMERCIAL

## 2023-09-22 VITALS
HEART RATE: 66 BPM | DIASTOLIC BLOOD PRESSURE: 82 MMHG | OXYGEN SATURATION: 99 % | HEIGHT: 70 IN | BODY MASS INDEX: 17.75 KG/M2 | WEIGHT: 124 LBS | SYSTOLIC BLOOD PRESSURE: 122 MMHG | RESPIRATION RATE: 18 BRPM

## 2023-09-22 DIAGNOSIS — R39.198 DIFFICULTY URINATING: Primary | ICD-10-CM

## 2023-09-22 DIAGNOSIS — N53.19 OTHER EJACULATORY DYSFUNCTION: ICD-10-CM

## 2023-09-22 DIAGNOSIS — R39.9 LOWER URINARY TRACT SYMPTOMS (LUTS): ICD-10-CM

## 2023-09-22 LAB — POST-VOID RESIDUAL VOLUME, ML POC: 35 ML

## 2023-09-22 PROCEDURE — 51798 US URINE CAPACITY MEASURE: CPT | Performed by: UROLOGY

## 2023-09-22 PROCEDURE — 99204 OFFICE O/P NEW MOD 45 MIN: CPT | Performed by: UROLOGY

## 2023-09-22 RX ORDER — CLINDAMYCIN PHOSPHATE 10 MG/ML
SOLUTION TOPICAL
COMMUNITY
Start: 2023-08-09

## 2023-09-22 RX ORDER — AMOXICILLIN AND CLAVULANATE POTASSIUM 875; 125 MG/1; MG/1
TABLET, FILM COATED ORAL
COMMUNITY
Start: 2023-08-09

## 2023-09-22 RX ORDER — HYDROCORTISONE 25 MG/G
CREAM TOPICAL
COMMUNITY
Start: 2023-08-09

## 2023-09-22 RX ORDER — ALBUTEROL SULFATE 90 UG/1
AEROSOL, METERED RESPIRATORY (INHALATION)
COMMUNITY
Start: 2023-09-12

## 2023-09-22 RX ORDER — QUETIAPINE FUMARATE 150 MG/1
TABLET, FILM COATED ORAL
COMMUNITY
Start: 2023-08-08

## 2023-09-22 RX ORDER — TAMSULOSIN HYDROCHLORIDE 0.4 MG/1
0.4 CAPSULE ORAL
COMMUNITY
Start: 2023-09-07

## 2023-09-22 RX ORDER — ATORVASTATIN CALCIUM 10 MG/1
TABLET, FILM COATED ORAL
COMMUNITY
Start: 2023-09-09

## 2023-09-22 NOTE — LETTER
September 22, 2023     Bisi Alvarez DO  [address] [address]    Patient: Edvin Dugan   YOB: 1985   Date of Visit: 9/22/2023       Dear Dr. Deepthi Lance:    Thank you for referring Edvin Dugan to me for evaluation. Below are my notes for this consultation. If you have questions, please do not hesitate to call me. I look forward to following your patient along with you. Sincerely,        Dluce Estrada DO        CC: No Recipients    Rachel Church  9/22/2023 10:40 AM  Signed  UROLOGY NEW PATIENT ENCOUNTER    Edvin Dugan is a 45 y.o. male with difficulty urinating. Pertinent PMH/PSH: previous drug/alcohol abuse (clean since around 2012)    CTCAP w/ 3/20/23: no HN BL, right renal cyst; prostate measured around 40 g    Spring 2023: Started having LUTS including hesitancy, weak stream. Denied frequency, gross hematuria, dysuria, nocturia. Started Flomax by PCP around June 2023. Did not take consistently. Not sure if it helped symptoms. Since August 2023 he noted decreased fluid ejaculation. This was associated with him starting the Flomax. PVR 35 cc on 9/22/23. Assessment and plan:     LUTS    Patient noted that for the past 6 or so months, he has noted some mild bothersome urinary symptoms. His main issue has been hesitancy. He said that when he was younger he used to get "stage fright" when urinating. He feels like this is pretty similar to that. He denied other voiding symptoms including nocturia, frequency, urgency, incontinence, dysuria, gross hematuria. His PCP prescribed him Flomax. He has not taken this every day consistently. He said that when he does take it consistently, it does help his symptoms a bit. He also came to the clinic today with concerns for decreased ejaculation over the past 1 to 2 months. He said that this did coincide with him starting the Flomax.   I explained to him that one of the most common side effects of Flomax is retrograde ejaculation, which is when the patient is able to have orgasm and ejaculate, however, the semen is not expelled Feller like he usually has. He said that he had similar issues in the past when he was on Effexor. I explained that this was a very similar mechanism of action to Flomax in terms of the bladder neck musculature, so it makes sense that he is experiencing similar side effect profile. I told him that once he stops the Flomax, he should be able to ejaculate significantly better. However, I explained that he would need to balance this with not taking Flomax for his voiding symptoms. He stated that the Flomax never improved his symptoms all that much, and he does overall have pretty minor symptoms. He stated that he would rather have normal ejaculation and slightly mild voiding symptoms then continue with Flomax and continuing to get retrograde ejaculation. We then agreed on a plan that he would stop the Flomax. I explained that in regards to his voiding symptoms, they are overall quite mild and he does demonstrate the ability to empty his bladder, with a random bladder scan of 35 cc in the office today. I provided him reassurance that even though he feels like he is not able to initiate his voids, he is voiding properly once he does start with his urination. I personally reviewed CT and explained to him that his prostate looked around 30-40 g which was somewhat enlarged for someone his age, but we would need to do cystoscopy to know for sure if prostate is really obstructing. We agreed that he can come back in 3 months. We will see if his ejaculation is improved that time now that he is off Flomax. We will also see if his voiding symptoms have continued. I explained if he is still having concerning voiding symptoms, we could consider future cystoscopy.   He said he would rather not undergo the procedure at this time, however, he does understand that the only way to definitively diagnose blockages in his lower urinary tract would be cystoscopy. Ejaculatory dysfunction    See above        PLAN  -Stop Flomax  -RTC in 3 months for symptom check  -If still having voiding symptoms at next visit, will consider cystoscopy      Portions of the above record have been created with voice recognition software. Occasional wrong word or "sound alike" substitution may have occurred due to the inherent limitations of voice recognition software. Read the chart carefully and recognize, using context, where substitution may have occurred. Breana Hodges DO        Chief Complaint     LUTS      History of Present Illness     Melanie Castro is a 45 y.o. male presenting in consultation for LUTS, ejaculatory dysfunction. The patient was referred by Dr. Justin Vaughn and today's note has been sent to the referring provider. See above summary    Denied fevers, chills, GH, dysuria        The following portions of the patient's history were reviewed and updated as appropriate: allergies, current medications, past family history, past medical history, past social history, past surgical history and problem list.            Review of Systems     Review of Systems   Constitutional: Negative for chills and fever. Respiratory: Negative for cough and shortness of breath. Gastrointestinal: Negative for abdominal pain and nausea. Genitourinary: Negative for dysuria and hematuria. Neurological: Negative for dizziness and light-headedness. Psychiatric/Behavioral: Negative for agitation and behavioral problems. Allergies     Allergies   Allergen Reactions   • Morphine      Other reaction(s): Other (See Comments)  signs consistent w/SOB       Physical Exam     Physical Exam  Constitutional:       General: He is not in acute distress. HENT:      Head: Normocephalic and atraumatic. Pulmonary:      Effort: Pulmonary effort is normal. No respiratory distress. Abdominal:      General: Abdomen is flat. Palpations: Abdomen is soft. Tenderness: There is no right CVA tenderness or left CVA tenderness. Skin:     General: Skin is warm and dry. Neurological:      Mental Status: He is alert. Psychiatric:         Mood and Affect: Mood normal.         Behavior: Behavior normal.             Vital Signs  There were no vitals filed for this visit. Current Medications       Current Outpatient Medications:   •  buPROPion (ZYBAN) 150 MG 12 hr tablet, take 1 tablet by mouth every morning for 3 days then INCREASE to . ..  (REFER TO PRESCRIPTION NOTES). , Disp: , Rfl:   •  QUEtiapine (SEROquel) 25 mg tablet, 1 tablet, Disp: , Rfl:   •  venlafaxine (EFFEXOR) 25 mg tablet, Every 12 hours, Disp: , Rfl:       Active Problems     There is no problem list on file for this patient. Past Medical History     Past Medical History:   Diagnosis Date   • Anxiety    • Asthma    • Depression          Surgical History     Past Surgical History:   Procedure Laterality Date   • CHEST WALL RECONSTRUCTION     • WISDOM TOOTH EXTRACTION           Family History     No family history on file. Social History     Social History     Social History     Tobacco Use   Smoking Status Every Day   • Packs/day: 1.00   • Types: Cigarettes   Smokeless Tobacco Never         Pertinent Lab Values     Lab Results   Component Value Date    CREATININE 0.89 03/20/2023       No results found for: "PSA"            Pertinent Imaging     The patient's images were reviewed by me personally and also in real time with them in the examination room using our PACS imaging system.       CTCAP w/ 3/20/23: no HN BL, right renal cyst; prostate measured around 40 g      Pertinent Pathology     N/A

## 2023-09-22 NOTE — PATIENT INSTRUCTIONS
Stop taking Flomax    Pay attention to your urination and check for trends and see how your symptoms are over the next few months.     See if your ejaculation improves after stopping Flomax    Come back in around 3 months

## 2023-09-22 NOTE — PROGRESS NOTES
UROLOGY NEW PATIENT ENCOUNTER    Rima Sawyer is a 45 y.o. male with difficulty urinating. Pertinent PMH/PSH: previous drug/alcohol abuse (clean since around 2012)    CTCAP w/ 3/20/23: no HN BL, right renal cyst; prostate measured around 40 g    Spring 2023: Started having LUTS including hesitancy, weak stream. Denied frequency, gross hematuria, dysuria, nocturia. Started Flomax by PCP around June 2023. Did not take consistently. Not sure if it helped symptoms. Since August 2023 he noted decreased fluid ejaculation. This was associated with him starting the Flomax. PVR 35 cc on 9/22/23. Assessment and plan:     LUTS    Patient noted that for the past 6 or so months, he has noted some mild bothersome urinary symptoms. His main issue has been hesitancy. He said that when he was younger he used to get "stage fright" when urinating. He feels like this is pretty similar to that. He denied other voiding symptoms including nocturia, frequency, urgency, incontinence, dysuria, gross hematuria. His PCP prescribed him Flomax. He has not taken this every day consistently. He said that when he does take it consistently, it does help his symptoms a bit. He also came to the clinic today with concerns for decreased ejaculation over the past 1 to 2 months. He said that this did coincide with him starting the Flomax. I explained to him that one of the most common side effects of Flomax is retrograde ejaculation, which is when the patient is able to have orgasm and ejaculate, however, the semen is not expelled Feller like he usually has. He said that he had similar issues in the past when he was on Effexor. I explained that this was a very similar mechanism of action to Flomax in terms of the bladder neck musculature, so it makes sense that he is experiencing similar side effect profile. I told him that once he stops the Flomax, he should be able to ejaculate significantly better.   However, I explained that he would need to balance this with not taking Flomax for his voiding symptoms. He stated that the Flomax never improved his symptoms all that much, and he does overall have pretty minor symptoms. He stated that he would rather have normal ejaculation and slightly mild voiding symptoms then continue with Flomax and continuing to get retrograde ejaculation. We then agreed on a plan that he would stop the Flomax. I explained that in regards to his voiding symptoms, they are overall quite mild and he does demonstrate the ability to empty his bladder, with a random bladder scan of 35 cc in the office today. I provided him reassurance that even though he feels like he is not able to initiate his voids, he is voiding properly once he does start with his urination. I personally reviewed CT and explained to him that his prostate looked around 30-40 g which was somewhat enlarged for someone his age, but we would need to do cystoscopy to know for sure if prostate is really obstructing. We agreed that he can come back in 3 months. We will see if his ejaculation is improved that time now that he is off Flomax. We will also see if his voiding symptoms have continued. I explained if he is still having concerning voiding symptoms, we could consider future cystoscopy. He said he would rather not undergo the procedure at this time, however, he does understand that the only way to definitively diagnose blockages in his lower urinary tract would be cystoscopy. Ejaculatory dysfunction    See above        PLAN  -Stop Flomax  -RTC in 3 months for symptom check  -If still having voiding symptoms at next visit, will consider cystoscopy      Portions of the above record have been created with voice recognition software. Occasional wrong word or "sound alike" substitution may have occurred due to the inherent limitations of voice recognition software.   Read the chart carefully and recognize, using context, where substitution may have occurred. Dulce Estrada DO        Chief Complaint     LUTS      History of Present Illness     Edvin Dugan is a 45 y.o. male presenting in consultation for LUTS, ejaculatory dysfunction. The patient was referred by Dr. Deepthi Lance and today's note has been sent to the referring provider. See above summary    Denied fevers, chills, GH, dysuria        The following portions of the patient's history were reviewed and updated as appropriate: allergies, current medications, past family history, past medical history, past social history, past surgical history and problem list.            Review of Systems     Review of Systems   Constitutional: Negative for chills and fever. Respiratory: Negative for cough and shortness of breath. Gastrointestinal: Negative for abdominal pain and nausea. Genitourinary: Negative for dysuria and hematuria. Neurological: Negative for dizziness and light-headedness. Psychiatric/Behavioral: Negative for agitation and behavioral problems. Allergies     Allergies   Allergen Reactions   • Morphine      Other reaction(s): Other (See Comments)  signs consistent w/SOB       Physical Exam     Physical Exam  Constitutional:       General: He is not in acute distress. HENT:      Head: Normocephalic and atraumatic. Pulmonary:      Effort: Pulmonary effort is normal. No respiratory distress. Abdominal:      General: Abdomen is flat. Palpations: Abdomen is soft. Tenderness: There is no right CVA tenderness or left CVA tenderness. Skin:     General: Skin is warm and dry. Neurological:      Mental Status: He is alert. Psychiatric:         Mood and Affect: Mood normal.         Behavior: Behavior normal.             Vital Signs  There were no vitals filed for this visit.       Current Medications       Current Outpatient Medications:   •  buPROPion (ZYBAN) 150 MG 12 hr tablet, take 1 tablet by mouth every morning for 3 days then INCREASE to . ..  (REFER TO PRESCRIPTION NOTES). , Disp: , Rfl:   •  QUEtiapine (SEROquel) 25 mg tablet, 1 tablet, Disp: , Rfl:   •  venlafaxine (EFFEXOR) 25 mg tablet, Every 12 hours, Disp: , Rfl:       Active Problems     There is no problem list on file for this patient. Past Medical History     Past Medical History:   Diagnosis Date   • Anxiety    • Asthma    • Depression          Surgical History     Past Surgical History:   Procedure Laterality Date   • CHEST WALL RECONSTRUCTION     • WISDOM TOOTH EXTRACTION           Family History     No family history on file. Social History     Social History     Social History     Tobacco Use   Smoking Status Every Day   • Packs/day: 1.00   • Types: Cigarettes   Smokeless Tobacco Never         Pertinent Lab Values     Lab Results   Component Value Date    CREATININE 0.89 03/20/2023       No results found for: "PSA"            Pertinent Imaging     The patient's images were reviewed by me personally and also in real time with them in the examination room using our PACS imaging system.       CTCAP w/ 3/20/23: no HN BL, right renal cyst; prostate measured around 40 g      Pertinent Pathology     N/A

## 2023-09-22 NOTE — LETTER
September 22, 2023     Juhi Zamarripa DO  [address] [facility] [address]    Patient: Rima Sawyer   YOB: 1985   Date of Visit: 9/22/2023       Dear Dr. Issac Goldberg:    Thank you for referring Rima Sawyer to me for evaluation. Below are my notes for this consultation. If you have questions, please do not hesitate to call me. I look forward to following your patient along with you. Sincerely,        Alfred Bernard DO        CC: No Recipients    Rachel Rodriguez  9/22/2023 10:39 AM  Sign when Signing Visit  UROLOGY NEW PATIENT ENCOUNTER    Rima Sawyer is a 45 y.o. male with difficulty urinating. Pertinent PMH/PSH: previous drug/alcohol abuse (clean since around 2012)    CTCAP w/ 3/20/23: no HN BL, right renal cyst; prostate measured around 40 g    Spring 2023: Started having LUTS including hesitancy, weak stream. Denied frequency, gross hematuria, dysuria, nocturia. Started Flomax by PCP around June 2023. Did not take consistently. Not sure if it helped symptoms. Since August 2023 he noted decreased fluid ejaculation. This was associated with him starting the Flomax. PVR 35 cc on 9/22/23. Assessment and plan:     LUTS    Patient noted that for the past 6 or so months, he has noted some mild bothersome urinary symptoms. His main issue has been hesitancy. He said that when he was younger he used to get "stage fright" when urinating. He feels like this is pretty similar to that. He denied other voiding symptoms including nocturia, frequency, urgency, incontinence, dysuria, gross hematuria. His PCP prescribed him Flomax. He has not taken this every day consistently. He said that when he does take it consistently, it does help his symptoms a bit. He also came to the clinic today with concerns for decreased ejaculation over the past 1 to 2 months. He said that this did coincide with him starting the Flomax.   I explained to him that one of the most common side effects of Flomax is retrograde ejaculation, which is when the patient is able to have orgasm and ejaculate, however, the semen is not expelled Feller like he usually has. He said that he had similar issues in the past when he was on Effexor. I explained that this was a very similar mechanism of action to Flomax in terms of the bladder neck musculature, so it makes sense that he is experiencing similar side effect profile. I told him that once he stops the Flomax, he should be able to ejaculate significantly better. However, I explained that he would need to balance this with not taking Flomax for his voiding symptoms. He stated that the Flomax never improved his symptoms all that much, and he does overall have pretty minor symptoms. He stated that he would rather have normal ejaculation and slightly mild voiding symptoms then continue with Flomax and continuing to get retrograde ejaculation. We then agreed on a plan that he would stop the Flomax. I explained that in regards to his voiding symptoms, they are overall quite mild and he does demonstrate the ability to empty his bladder, with a random bladder scan of 35 cc in the office today. I provided him reassurance that even though he feels like he is not able to initiate his voids, he is voiding properly once he does start with his urination. We agreed that he can come back in 3 months. We will see if his ejaculation is improved that time now that he is off Flomax. We will also see if his voiding symptoms have continued. I explained if he is still having concerning voiding symptoms, we could consider future cystoscopy. He said he would rather not undergo the procedure at this time, however, he does understand that the only way to definitively diagnose blockages in his lower urinary tract would be cystoscopy.     Ejaculatory dysfunction    See above        PLAN  -Stop Flomax  -RTC in 3 months for symptom check  -If still having voiding symptoms at next visit, will consider cystoscopy      Portions of the above record have been created with voice recognition software. Occasional wrong word or "sound alike" substitution may have occurred due to the inherent limitations of voice recognition software. Read the chart carefully and recognize, using context, where substitution may have occurred. Sobia Laboy DO        Chief Complaint     LUTS      History of Present Illness     Juanpablo Jovel is a 45 y.o. male presenting in consultation for LUTS, ejaculatory dysfunction. The patient was referred by Dr. Rozina Villegas and today's note has been sent to the referring provider. See above summary    Denied fevers, chills, GH, dysuria        The following portions of the patient's history were reviewed and updated as appropriate: allergies, current medications, past family history, past medical history, past social history, past surgical history and problem list.            Review of Systems     Review of Systems   Constitutional: Negative for chills and fever. Respiratory: Negative for cough and shortness of breath. Gastrointestinal: Negative for abdominal pain and nausea. Genitourinary: Negative for dysuria and hematuria. Neurological: Negative for dizziness and light-headedness. Psychiatric/Behavioral: Negative for agitation and behavioral problems. Allergies     Allergies   Allergen Reactions   • Morphine      Other reaction(s): Other (See Comments)  signs consistent w/SOB       Physical Exam     Physical Exam  Constitutional:       General: He is not in acute distress. HENT:      Head: Normocephalic and atraumatic. Pulmonary:      Effort: Pulmonary effort is normal. No respiratory distress. Abdominal:      General: Abdomen is flat. Palpations: Abdomen is soft. Tenderness: There is no right CVA tenderness or left CVA tenderness. Skin:     General: Skin is warm and dry. Neurological:      Mental Status: He is alert. Psychiatric:         Mood and Affect: Mood normal.         Behavior: Behavior normal.             Vital Signs  There were no vitals filed for this visit. Current Medications       Current Outpatient Medications:   •  buPROPion (ZYBAN) 150 MG 12 hr tablet, take 1 tablet by mouth every morning for 3 days then INCREASE to . ..  (REFER TO PRESCRIPTION NOTES). , Disp: , Rfl:   •  QUEtiapine (SEROquel) 25 mg tablet, 1 tablet, Disp: , Rfl:   •  venlafaxine (EFFEXOR) 25 mg tablet, Every 12 hours, Disp: , Rfl:       Active Problems     There is no problem list on file for this patient. Past Medical History     Past Medical History:   Diagnosis Date   • Anxiety    • Asthma    • Depression          Surgical History     Past Surgical History:   Procedure Laterality Date   • CHEST WALL RECONSTRUCTION     • WISDOM TOOTH EXTRACTION           Family History     No family history on file. Social History     Social History     Social History     Tobacco Use   Smoking Status Every Day   • Packs/day: 1.00   • Types: Cigarettes   Smokeless Tobacco Never         Pertinent Lab Values     Lab Results   Component Value Date    CREATININE 0.89 03/20/2023       No results found for: "PSA"            Pertinent Imaging     The patient's images were reviewed by me personally and also in real time with them in the examination room using our PACS imaging system.       CTCAP w/ 3/20/23: no HN BL, right renal cyst; prostate measured around 40 g      Pertinent Pathology     N/A

## 2023-10-05 ENCOUNTER — CONSULT (OUTPATIENT)
Dept: PULMONOLOGY | Facility: CLINIC | Age: 38
End: 2023-10-05
Payer: COMMERCIAL

## 2023-10-05 VITALS
HEIGHT: 70 IN | DIASTOLIC BLOOD PRESSURE: 80 MMHG | OXYGEN SATURATION: 99 % | WEIGHT: 121 LBS | BODY MASS INDEX: 17.32 KG/M2 | HEART RATE: 81 BPM | SYSTOLIC BLOOD PRESSURE: 128 MMHG

## 2023-10-05 DIAGNOSIS — R91.1 LUNG NODULE: ICD-10-CM

## 2023-10-05 DIAGNOSIS — J43.2 CENTRILOBULAR EMPHYSEMA (HCC): Primary | ICD-10-CM

## 2023-10-05 DIAGNOSIS — F17.211 NICOTINE DEPENDENCE, CIGARETTES, IN REMISSION: ICD-10-CM

## 2023-10-05 PROCEDURE — 99204 OFFICE O/P NEW MOD 45 MIN: CPT | Performed by: INTERNAL MEDICINE

## 2023-10-05 PROCEDURE — 94010 BREATHING CAPACITY TEST: CPT

## 2023-10-05 NOTE — PROGRESS NOTES
Pulmonary Consultation   Clem Santamaria 45 y.o. male MRN: [id number]    Encounter: [id number]      Reason for consultation:   Abnormal CT scan of the chest    Requesting physician:  Olena Tony DO      Impressions:   · Centrilobular emphysema. · Lung nodule. This has been stable since 2019 according to the patient. · History of tobacco use. Recommendations:  · Spirometry. · Follow-up as needed. Discussion:  The patient has centrilobular emphysema. This is secondary to the 22-pack-year smoking history. It is mild. It is not clinically significant. The patient has no airflow limitation on the spirometry. He is fairly asymptomatic. I have emphasized on the importance of remaining smoke-free. There is no indication for inhalers. The pulmonary nodule has been stable since 2019 according to the patient. No further follow-up imaging studies are indicated. I have not scheduled him for another appointment however I will be happy to see him in the future if the need arises. History of Present Illness   HPI:  Clem Santamaria is a 45 y.o. male who is here for evaluation of abnormal CT of the chest.  The patient stated that he was having CT scans of the chest done to follow a pulmonary nodule. There was another abnormality concerning for COPD. For this reason he was referred to me for further management. The patient has about 22-pack-year smoking history. He quit smoking recently    He denies cough, wheezing or sputum production. He denies shortness of breath even on exertion. Review of systems:  12 point review of systems was completed and was otherwise negative except as listed in HPI. Historical Information   Past Medical History:   Diagnosis Date   • Anxiety    • Asthma    • Depression      Past Surgical History:   Procedure Laterality Date   • CHEST WALL RECONSTRUCTION     • WISDOM TOOTH EXTRACTION       No family history on file.     Family History:  No history of chronic lung disease in his immediate family. Social History:  The patient lives by himself. He has no pets. Has 22-pack-year smoking history and quit smoking recently. He works in a factory making steel cans      Meds/Allergies   No current facility-administered medications for this visit. (Not in a hospital admission)    No Active Allergies    Vitals: Blood pressure 128/80, pulse 81, height 5' 10" (1.778 m), weight 54.9 kg (121 lb), SpO2 99 %. ,      Physical exam:        Head/eyes:    Normocephalic, without obvious abnormality, atraumatic,         PERRL, extraocular muscles intact, no scleral icterus    Nose:   Nares normal, septum midline, mucosa normal, no drainage    or sinus tenderness   Throat:   Moist mucous membranes, no thrush   Neck:   Supple, trachea midline, no adenopathy; no carotid    bruit or JVD   Lungs:    Clear breath sounds. No wheezing or rhonchi. Heart:    Regular rate and rhythm, S1 and S2 normal, no murmur, rub   or gallop   Abdomen:     Soft, non-tender, bowel sounds active all four quadrants,     no masses, no organomegaly   Extremities:   Extremities normal, atraumatic, no cyanosis or edema   Skin:   Warm, dry, turgor normal, no rashes or lesions   Neurologic:   CNII-XII intact, normal strength, non-focal             Imaging and other studies: I have personally reviewed pertinent films in PACS CT scan of the chest is reviewed on PACS system. There is centrilobular emphysema mainly in the right upper lobe. Spirometry done today in the office. No evidence of airflow limitation.     Lab Results   Component Value Date    WBC 6.68 03/20/2023    HGB 13.7 03/20/2023    HCT 39.9 03/20/2023    MCV 91 03/20/2023     03/20/2023     Lab Results   Component Value Date    SODIUM 139 03/20/2023    K 3.6 03/20/2023     03/20/2023    CO2 28 03/20/2023    BUN 19 03/20/2023    CREATININE 0.89 03/20/2023    GLUC 87 03/20/2023    CALCIUM 9.1 03/20/2023           Rosa Jenni Wilson MD

## 2024-04-03 ENCOUNTER — OFFICE VISIT (OUTPATIENT)
Dept: PULMONOLOGY | Facility: CLINIC | Age: 39
End: 2024-04-03
Payer: COMMERCIAL

## 2024-04-03 VITALS
DIASTOLIC BLOOD PRESSURE: 70 MMHG | BODY MASS INDEX: 18.04 KG/M2 | OXYGEN SATURATION: 99 % | TEMPERATURE: 98.1 F | SYSTOLIC BLOOD PRESSURE: 114 MMHG | WEIGHT: 126 LBS | HEIGHT: 70 IN | HEART RATE: 67 BPM

## 2024-04-03 DIAGNOSIS — G47.33 OSA (OBSTRUCTIVE SLEEP APNEA): Primary | ICD-10-CM

## 2024-04-03 DIAGNOSIS — J43.2 CENTRILOBULAR EMPHYSEMA (HCC): ICD-10-CM

## 2024-04-03 DIAGNOSIS — F17.211 NICOTINE DEPENDENCE, CIGARETTES, IN REMISSION: ICD-10-CM

## 2024-04-03 PROCEDURE — 99213 OFFICE O/P EST LOW 20 MIN: CPT | Performed by: INTERNAL MEDICINE

## 2024-04-03 NOTE — PROGRESS NOTES
"Office Progress Note - Pulmonary    Juan Antonio Cesar 38 y.o. male MRN: [id number]    Encounter: [id number]      Assessment:  Obstructive sleep apnea.  Centrilobular emphysema.  History of tobacco use.    Plan:   Diagnostic polysomnogram.  Follow-up after the study is done.    Discussion:   The patient's daytime hypersomnolence most likely due to his untreated obstructive sleep apnea.  In order to get him a CPAP machine he has to repeat the sleep study.  I have ordered a diagnostic polysomnogram.  He will follow-up in the office after the study is done.      Subjective:   The patient is here because he has obstructive sleep apnea and it is untreated.  He told me he was diagnosed with obstructive sleep apnea in 2021.  He was prescribed a CPAP machine.  However he could not tolerate it.  Lately the patient has been feeling extremely tired during the day.  He felt it is time to do something about his obstructive sleep apnea.  He denies shortness of breath.  No cough or wheezing.  He quit smoking since his last visit.    Review of systems:  A 12 point system review is done and aside from what is stated above the rest of the review of systems is negative.      Family history and social history are reviewed.    Medications list is reviewed.      Vitals: Blood pressure 114/70, pulse 67, temperature 98.1 °F (36.7 °C), temperature source Tympanic, height 5' 10\" (1.778 m), weight 57.2 kg (126 lb), SpO2 99%.,     Physical Exam  Gen: Awake, alert, oriented x 3, no acute distress  HEENT: Mucous membranes moist, no oral lesions, no thrush  NECK: No accessory muscle use, JVP not elevated  Cardiac: Regular, single S1, single S2, no murmurs, no rubs, no gallops  Lungs: Clear breath sounds.  No wheezing or rhonchi.  Abdomen: normoactive bowel sounds, soft nontender, nondistended, no rebound or rigidity, no guarding  Extremities: no cyanosis, no clubbing, no edema  Neuro:  Grossly nonfocal.  Skin:  No rash.      "

## 2024-04-04 DIAGNOSIS — G47.33 OSA (OBSTRUCTIVE SLEEP APNEA): Primary | ICD-10-CM

## 2024-06-08 PROBLEM — F84.0 AUTISM SPECTRUM DISORDER: Status: ACTIVE | Noted: 2022-10-17

## 2024-06-08 PROBLEM — F33.1 MODERATE EPISODE OF RECURRENT MAJOR DEPRESSIVE DISORDER (HCC): Status: ACTIVE | Noted: 2022-10-17

## 2024-06-08 PROBLEM — G47.30 SLEEP APNEA IN ADULT: Status: ACTIVE | Noted: 2024-06-08

## 2024-06-08 PROBLEM — F41.1 GENERALIZED ANXIETY DISORDER: Status: ACTIVE | Noted: 2022-10-17

## 2024-06-08 PROBLEM — F43.10 PTSD (POST-TRAUMATIC STRESS DISORDER): Status: ACTIVE | Noted: 2023-01-05

## 2024-06-08 PROBLEM — Z79.899 LONG TERM CURRENT USE OF ANTIPSYCHOTIC MEDICATION: Status: ACTIVE | Noted: 2023-07-21

## 2024-06-08 PROBLEM — F10.91 ALCOHOL USE DISORDER IN REMISSION: Status: ACTIVE | Noted: 2023-01-05

## 2024-06-08 PROBLEM — F17.201 TOBACCO USE DISORDER, MILD, IN SUSTAINED REMISSION: Status: ACTIVE | Noted: 2023-03-03

## 2024-06-08 PROBLEM — F12.21 CANNABIS USE DISORDER, MODERATE, IN EARLY REMISSION (HCC): Status: ACTIVE | Noted: 2023-07-21

## 2024-06-08 PROBLEM — F11.21 HEROIN USE DISORDER, SEVERE, IN SUSTAINED REMISSION (HCC): Status: ACTIVE | Noted: 2023-01-05

## 2024-06-08 PROBLEM — I49.3 PVCS (PREMATURE VENTRICULAR CONTRACTIONS): Status: ACTIVE | Noted: 2024-06-08

## 2024-06-10 ENCOUNTER — OFFICE VISIT (OUTPATIENT)
Dept: CARDIOLOGY CLINIC | Facility: CLINIC | Age: 39
End: 2024-06-10
Payer: COMMERCIAL

## 2024-06-10 VITALS
DIASTOLIC BLOOD PRESSURE: 60 MMHG | HEART RATE: 67 BPM | WEIGHT: 131 LBS | SYSTOLIC BLOOD PRESSURE: 110 MMHG | HEIGHT: 70 IN | BODY MASS INDEX: 18.75 KG/M2

## 2024-06-10 DIAGNOSIS — I49.3 PVCS (PREMATURE VENTRICULAR CONTRACTIONS): Primary | ICD-10-CM

## 2024-06-10 PROBLEM — I51.9: Status: ACTIVE | Noted: 2024-06-10

## 2024-06-10 PROBLEM — O99.411: Status: ACTIVE | Noted: 2024-06-10

## 2024-06-10 PROCEDURE — 99204 OFFICE O/P NEW MOD 45 MIN: CPT | Performed by: INTERNAL MEDICINE

## 2024-06-10 PROCEDURE — 93000 ELECTROCARDIOGRAM COMPLETE: CPT | Performed by: INTERNAL MEDICINE

## 2024-06-10 RX ORDER — LITHIUM CARBONATE 300 MG
300 TABLET ORAL 2 TIMES DAILY
COMMUNITY
Start: 2024-05-17

## 2024-06-10 RX ORDER — BUPROPION HYDROCHLORIDE 300 MG/1
300 TABLET ORAL DAILY
COMMUNITY
Start: 2024-05-17

## 2024-06-10 RX ORDER — QUETIAPINE FUMARATE 100 MG/1
100 TABLET, FILM COATED ORAL
COMMUNITY
Start: 2024-05-14

## 2024-06-10 RX ORDER — SODIUM FLUORIDE 6 MG/ML
PASTE, DENTIFRICE DENTAL
COMMUNITY
Start: 2024-03-11

## 2024-06-10 NOTE — PROGRESS NOTES
[facility]  [address] [place] [address]  Phone#  [phone]   Fax#  [phone]  *-*-*-*-*-*-*-*-*-*-*-*-*-*-*-*-*-*-*-*-*-*-*-*-*-*-*-*-*-*-*-*-*-*-*-*-*-*-*-*-*-*-*-*-*-*-*-*-*-*-*-*-*-*                                   Cardiology Follow Up      ENCOUNTER DATE: 06/10/24 5:57 PM  PATIENT NAME: Juan Antonio Cesar   : 1985    MRN: [id number]  AGE:38 y.o.      SEX: male  ENCOUNTER PROVIDER:Dhaval Gee MD     PRIMARY CARE PHYSICIAN: Lizabeth Valdez DO    CARDIOLOGY SPECIALTY COMMENTS  No specialty comments available.    ACTIVE PROBLEM LIST  Patient Active Problem List   Diagnosis    Alcohol use disorder in remission    Autism spectrum disorder    Cannabis use disorder, moderate, in early remission (HCC)    Generalized anxiety disorder    PTSD (post-traumatic stress disorder)    Heroin use disorder, severe, in sustained remission (HCC)    Long term current use of antipsychotic medication    Moderate episode of recurrent major depressive disorder (HCC)    PVCs (premature ventricular contractions)    Sleep apnea in adult    Tobacco use disorder, mild, in sustained remission     ACTIVE DIAGNOSIS AND PLAN    1. PVCs (premature ventricular contractions)  -     POCT ECG     INTERVAL HISTORY:        Patient made the appointment himself.  He did not make the appointment for PVCs.  He is unaware that he has PVCs from time to time.  The best he could describe is that he has a cardiac condition.  He was in [facility] 2023 with an appendicitis which was treated medically.  He was seen in consultation by  KEMAL, cardiologist at [place].  He is not sure but he thinks that they saw something on a scan but does not know what kind of scan it was or what they saw.  He was told that he should have yearly follow-up.  Other than that he is absolutely no information and he has no symptoms. He denies chest discomfort or shortness of breath.  He has no palpitations.   He denies symptoms of dizziness, lightheadedness or near-syncope/syncope.  He denies leg edema.  He denies symptoms of orthopnea or paroxysmal nocturnal dyspnea.    He smoked 15 years from age [age] and quit in October 2023.  He averaged over that time a pack and a half daily or 22-1/2 pack years.  He states that there have been many heart attacks in his family tree but he is unaware of any specifics.      DISCUSSION/PLAN:          Will send letter asking for medical records to    [address]  [place] [id number]    We will send letter to medical record department of [facility] asking for medical records of this patient for an appendicitis around April 2023 with specifics on any cardiology consult or any cardiology testing    We will send record to PCP:    NELIDA KAYE  [address] [facility]  [place] [id number]    We will call and make an appointment should anything positive come up and I request for records    Patient may need to come in the office and sign a release of information to get these records    Will see patient in 1 year if nothing more occurs regarding obtaining records or finding the etiology of his cardiac disorder    Results for orders placed or performed in visit on 06/10/24   POCT ECG    Narrative    Normal sinus rhythm right axis deviation borderline EKG       Lab Results   Component Value Date    HGBA1C 5.1 11/03/2023      Lab Results   Component Value Date    EGFR 109 03/20/2023    EGFR 94 01/18/2023    SODIUM 139 03/20/2023    SODIUM 141 01/18/2023    K 3.6 03/20/2023    K 3.9 01/18/2023     03/20/2023     01/18/2023    CO2 28 03/20/2023    CO2 30 01/18/2023    BUN 19 03/20/2023    BUN 15 01/18/2023    CREATININE 0.89 03/20/2023    CREATININE 1.01 01/18/2023     Lab Results   Component Value Date    WBC 6.68 03/20/2023    WBC 12.36 (H) 01/18/2023    HGB 13.7 03/20/2023    HGB 13.2 01/18/2023    HCT 39.9 03/20/2023    HCT 39.6 01/18/2023    MCV  91 2023    MCV 95 2023    MCH 31.4 2023    MCH 31.7 2023    MCHC 34.3 2023    MCHC 33.3 2023     2023     2023      Lab Results   Component Value Date    CALCIUM 9.1 2023    CALCIUM 8.8 2023    ALB 4.3 2023    ALB 3.6 2023    TP 6.9 2023    TP 7.3 2023    AST 16 2023    AST 16 2023    ALT 14 2023    ALT 25 2023    ALKPHOS 76 2023    ALKPHOS 104 2023     Lab Results   Component Value Date    BNP 10 2023       Current Outpatient Medications:     albuterol (PROVENTIL HFA,VENTOLIN HFA) 90 mcg/act inhaler, if needed for wheezing or shortness of breath, Disp: , Rfl:     atorvastatin (LIPITOR) 10 mg tablet, Take 10 mg by mouth daily, Disp: , Rfl:     buPROPion (WELLBUTRIN XL) 300 mg 24 hr tablet, Take 300 mg by mouth in the morning, Disp: , Rfl:     lithium 300 MG tablet, Take 300 mg by mouth 2 (two) times a day, Disp: , Rfl:     QUEtiapine (SEROquel) 100 mg tablet, Take 100 mg by mouth daily at bedtime, Disp: , Rfl:     Sodium Fluoride 5000 PPM 1.1 % PSTE, BRUSH TEETH AT BEDTIME, Disp: , Rfl:   No Known Allergies    Past Medical History:   Diagnosis Date    Anxiety     Asthma     Depression      Social History     Socioeconomic History    Marital status: Single     Spouse name: Not on file    Number of children: Not on file    Years of education: Not on file    Highest education level: Not on file   Occupational History    Not on file   Tobacco Use    Smoking status: Former     Current packs/day: 0.00     Average packs/day: 1 pack/day for 22.8 years (22.8 ttl pk-yrs)     Types: Cigarettes     Start date: 2001     Quit date: 10/2/2023     Years since quittin.6    Smokeless tobacco: Never   Vaping Use    Vaping status: Never Used   Substance and Sexual Activity    Alcohol use: Never    Drug use: Not Currently     Types: Marijuana    Sexual activity: Not on file   Other Topics  "Concern    Not on file   Social History Narrative    Not on file     Social Determinants of Health     Financial Resource Strain: Not on file   Food Insecurity: Not on file   Transportation Needs: Not on file   Physical Activity: Not on file   Stress: Not on file   Social Connections: Not on file   Intimate Partner Violence: Not on file   Housing Stability: Not on file      No family history on file.  Past Surgical History:   Procedure Laterality Date    CHEST WALL RECONSTRUCTION      WISDOM TOOTH EXTRACTION         PREVIOUS WEIGHTS:   Wt Readings from Last 10 Encounters:   06/10/24 59.4 kg (131 lb)   04/03/24 57.2 kg (126 lb)   10/05/23 54.9 kg (121 lb)   09/22/23 56.2 kg (124 lb)   03/20/23 56.5 kg (124 lb 8 oz)   08/27/20 72.7 kg (160 lb 3.2 oz)        Review of Systems:  Review of Systems   Constitutional: Negative.    HENT: Negative.     Eyes: Negative.    Respiratory:  Negative for chest tightness and shortness of breath.    Cardiovascular:  Negative for chest pain, palpitations and leg swelling.   Gastrointestinal: Negative.    Endocrine: Negative.    Musculoskeletal: Negative.    Skin: Negative.    Allergic/Immunologic: Negative.    Neurological: Negative.    Hematological: Negative.    Psychiatric/Behavioral: Negative.         Physical Exam:  /60   Pulse 67   Ht 5' 10\" (1.778 m)   Wt 59.4 kg (131 lb)   BMI 18.80 kg/m²     Physical Exam  Vitals reviewed.   Constitutional:       General: He is not in acute distress.     Appearance: He is well-developed.   HENT:      Head: Normocephalic and atraumatic.   Neck:      Thyroid: No thyromegaly.      Vascular: No carotid bruit or JVD.      Trachea: No tracheal deviation.   Cardiovascular:      Rate and Rhythm: Normal rate and regular rhythm.      Pulses: Normal pulses.      Heart sounds: Normal heart sounds. No murmur heard.     No friction rub. No gallop.   Pulmonary:      Effort: Pulmonary effort is normal. No respiratory distress.      Breath sounds: " "Normal breath sounds. No wheezing, rhonchi or rales.   Chest:      Chest wall: Deformity present. No tenderness.      Comments: Pectus excavatum  Abdominal:      General: Bowel sounds are normal. There is no distension.      Palpations: Abdomen is soft.      Tenderness: There is no abdominal tenderness.   Musculoskeletal:      Cervical back: Normal range of motion and neck supple.      Right lower leg: No edema.      Left lower leg: No edema.   Skin:     General: Skin is warm and dry.   Neurological:      General: No focal deficit present.      Mental Status: He is alert and oriented to person, place, and time.      Gait: Gait normal.   Psychiatric:         Mood and Affect: Mood normal.         Behavior: Behavior normal.         Thought Content: Thought content normal.         Judgment: Judgment normal.       ======================================================  Imaging:   I have personally reviewed pertinent reports.      Portions of the record may have been created with voice recognition software. Occasional wrong word or \"sound a like\" substitutions may have occurred due to the inherent limitations of voice recognition software. Read the chart carefully and recognize, using context, where substitutions have occurred.    SIGNATURES:   Dhaval Gee MD   "

## 2024-06-12 ENCOUNTER — TELEPHONE (OUTPATIENT)
Dept: CARDIOLOGY CLINIC | Facility: CLINIC | Age: 39
End: 2024-06-12

## 2024-06-12 NOTE — TELEPHONE ENCOUNTER
Called pt told him mailed  2  release of medical records forms to him to have signed and returned in already addressed and stamped envelope to obtain records from  [facility].  Pt understood and will sign and return to obtain records requested by Dr Gee.

## 2024-06-27 ENCOUNTER — DOCUMENTATION (OUTPATIENT)
Dept: CARDIOLOGY CLINIC | Facility: CLINIC | Age: 39
End: 2024-06-27

## 2024-07-18 ENCOUNTER — DOCUMENTATION (OUTPATIENT)
Dept: CARDIOLOGY CLINIC | Facility: CLINIC | Age: 39
End: 2024-07-18

## 2024-09-02 ENCOUNTER — TELEPHONE (OUTPATIENT)
Dept: CARDIOLOGY CLINIC | Facility: CLINIC | Age: 39
End: 2024-09-02

## 2024-09-02 NOTE — TELEPHONE ENCOUNTER
Office note from Vaibhav Bedoya MD [facility], [facility], [facility] cardiology, [address] [address], [address], [place] [address]    Assessment:  Heart palpitations  Anxiety and depression  Obstructive sleep apnea refuses CPAP  Nonexertional chest discomfort at night -> noncardiac chest pain or muscle skeletal discomfort minimally bothersome  Dyspnea on exertion related to COPD from smoking history abstained from alcohol since 2012  Formally used heroin last use in 2011  Cigarettes 1 pack daily for 22 years  He is concerned that secondary degree relatives have had sudden death but no first-degree relatives.    Plan:  Goal of 30 minutes daily aerobic activity  Request nutritional consult  Call with concerns, changes or questions  Follow-up 1 year    CLAUDIO Wilkinson

## 2024-09-10 ENCOUNTER — OFFICE VISIT (OUTPATIENT)
Dept: CARDIOLOGY CLINIC | Facility: CLINIC | Age: 39
End: 2024-09-10
Payer: COMMERCIAL

## 2024-09-10 VITALS
DIASTOLIC BLOOD PRESSURE: 68 MMHG | HEART RATE: 68 BPM | OXYGEN SATURATION: 99 % | WEIGHT: 126 LBS | BODY MASS INDEX: 18.04 KG/M2 | SYSTOLIC BLOOD PRESSURE: 112 MMHG | HEIGHT: 70 IN

## 2024-09-10 DIAGNOSIS — I49.3 PVCS (PREMATURE VENTRICULAR CONTRACTIONS): ICD-10-CM

## 2024-09-10 DIAGNOSIS — Z82.49 FAMILY HISTORY OF HEART DISEASE: ICD-10-CM

## 2024-09-10 DIAGNOSIS — F17.201 TOBACCO USE DISORDER, MILD, IN SUSTAINED REMISSION: ICD-10-CM

## 2024-09-10 DIAGNOSIS — E78.2 MIXED HYPERLIPIDEMIA: ICD-10-CM

## 2024-09-10 DIAGNOSIS — R07.2 PRECORDIAL PAIN: Primary | ICD-10-CM

## 2024-09-10 DIAGNOSIS — Z82.41 FAMILY HISTORY OF SUDDEN CARDIAC DEATH: ICD-10-CM

## 2024-09-10 PROBLEM — O99.411: Status: RESOLVED | Noted: 2024-06-10 | Resolved: 2024-09-10

## 2024-09-10 PROBLEM — I51.9: Status: RESOLVED | Noted: 2024-06-10 | Resolved: 2024-09-10

## 2024-09-10 PROCEDURE — 99214 OFFICE O/P EST MOD 30 MIN: CPT | Performed by: INTERNAL MEDICINE

## 2024-09-10 RX ORDER — METOPROLOL TARTRATE 25 MG/1
25 TABLET, FILM COATED ORAL EVERY 12 HOURS SCHEDULED
Qty: 4 TABLET | Refills: 0 | Status: SHIPPED | OUTPATIENT
Start: 2024-09-10

## 2024-09-10 RX ORDER — CLINDAMYCIN PHOSPHATE 10 MG/ML
SOLUTION TOPICAL
COMMUNITY
Start: 2024-08-01

## 2024-09-10 NOTE — PATIENT INSTRUCTIONS
You were seen today in the Cardiology office for follow up evaluation.     Please continue your current cardiac medications as prescribed.    Please schedule your Echocardiogram and cardiac CT scan.     Thank you for choosing [facility].    Please call our office or use [employer] with any questions.

## 2024-09-10 NOTE — PROGRESS NOTES
[facility]    Name:Juan Antonio Cesar   DOS: 9/10/2024     Chief Complaint:   Chief Complaint   Patient presents with    Follow-up     Switching providers. Prior pt of Dr Gee. Would like to discuss statins.    Shortness of Breath     With exertion the past year       HISTORY OF PRESENT ILLNESS:    HPI:  Juan Antonio Cesar is a 39 y.o. male. He  has a past medical history of Anxiety, Asthma, and Depression.    He presents for follow-up evaluation.  Was last seen in the office setting by my colleague Dr. Gee on 6/10/2024.  He historically followed with the cardiology group at [facility] in the past.  Review of prior records demonstrates that he had been evaluated there for palpitations, as well as hyperlipidemia given a family history of heart disease.  He has been maintained on atorvastatin at varying doses, most recently a dose of 10 mg once daily.    Today, he reports that he has been experiencing multiple cardiopulmonary symptoms that he would like to discuss.  The patient describes experiencing exertional chest pain particularly when he is at work lifting things or exerting himself.  He works at a steel mill/manufacturing plant, and his job often requires strenuous activity.  He reports no chest discomfort or pain at rest, and the episodes that do occur reliably relieved when he sits down to rest.  These episodes are associated with shortness of breath.  He has historically been evaluated for this complaint in the past, including an ER visit March 2023.  At that time, EKG demonstrated no evidence of ischemia and a single high-sensitivity troponin was within normal limits.    As a separate issue, he reports that he has been experiencing episodic palpitations, described as single flutters in his chest.  Much less frequent now than they have been in the past.  Often precipitated by anxiety that he is self aware of.    He otherwise has no cardiopulmonary complaints, and specifically  denies dizziness, diaphoresis, orthopnea, edema, syncope.  He does not exercise regularly.    Patient reports a very strong family history of heart disease, including reported sudden cardiac death in multiple paternal uncles.  There is a family history of coronary artery disease in his father, unspecified severity.  There is no known family history of valvular heart disease or documented history of cardiac arrhythmia.     ROS    ROS: Pertinent positives and negatives as described in History of Present Illness. Remainder of a 14 point review of systems was negative.     No Known Allergies     Current Outpatient Medications on File Prior to Visit   Medication Sig Dispense Refill    albuterol (PROVENTIL HFA,VENTOLIN HFA) 90 mcg/act inhaler if needed for wheezing or shortness of breath      atorvastatin (LIPITOR) 10 mg tablet Take 10 mg by mouth daily      buPROPion (WELLBUTRIN XL) 300 mg 24 hr tablet Take 300 mg by mouth in the morning      clindamycin (CLEOCIN T) 1 % apply topically to affected area twice a day      QUEtiapine (SEROquel) 100 mg tablet Take 100 mg by mouth daily at bedtime      Sodium Fluoride 5000 PPM 1.1 % PSTE BRUSH TEETH AT BEDTIME      lithium 300 MG tablet Take 300 mg by mouth 2 (two) times a day (Patient not taking: Reported on 9/10/2024)       No current facility-administered medications on file prior to visit.       Past Medical History:   Diagnosis Date    Anxiety     Asthma     Depression        Past Surgical History:   Procedure Laterality Date    CHEST WALL RECONSTRUCTION      WISDOM TOOTH EXTRACTION         No family history on file.    Social History     Socioeconomic History    Marital status: Single     Spouse name: Not on file    Number of children: Not on file    Years of education: Not on file    Highest education level: Not on file   Occupational History    Not on file   Tobacco Use    Smoking status: Former     Current packs/day: 0.00     Average packs/day: 1 pack/day for 22.8  "years (22.8 ttl pk-yrs)     Types: Cigarettes     Start date: 2001     Quit date: 10/2/2023     Years since quittin.9    Smokeless tobacco: Never   Vaping Use    Vaping status: Never Used   Substance and Sexual Activity    Alcohol use: Never    Drug use: Not Currently     Types: Marijuana    Sexual activity: Not on file   Other Topics Concern    Not on file   Social History Narrative    Not on file     Social Determinants of Health     Financial Resource Strain: Not on file   Food Insecurity: Not on file   Transportation Needs: Not on file   Physical Activity: Not on file   Stress: Not on file   Social Connections: Not on file   Intimate Partner Violence: Not on file   Housing Stability: Not on file       OBJECTIVE:    /68 (BP Location: Left arm, Patient Position: Sitting, Cuff Size: Standard)   Pulse 68   Ht 5' 10\" (1.778 m)   Wt 57.2 kg (126 lb)   SpO2 99%   BMI 18.08 kg/m²      BP Readings from Last 3 Encounters:   09/10/24 112/68   06/10/24 110/60   24 114/70       Wt Readings from Last 3 Encounters:   09/10/24 57.2 kg (126 lb)   06/10/24 59.4 kg (131 lb)   24 57.2 kg (126 lb)         Physical Exam  Vitals reviewed.   Constitutional:       General: He is not in acute distress.     Appearance: Normal appearance. He is not diaphoretic.   HENT:      Head: Normocephalic and atraumatic.   Eyes:      Conjunctiva/sclera: Conjunctivae normal.   Neck:      Vascular: No JVD.   Cardiovascular:      Rate and Rhythm: Normal rate and regular rhythm.      Pulses: Normal pulses.      Heart sounds: Normal heart sounds. No murmur heard.     No friction rub. No gallop.   Pulmonary:      Effort: Pulmonary effort is normal.      Breath sounds: Normal breath sounds. No wheezing, rhonchi or rales.   Abdominal:      General: Abdomen is flat. Bowel sounds are normal.   Musculoskeletal:      Right lower leg: No edema.      Left lower leg: No edema.   Skin:     General: Skin is warm and dry.   Neurological: "      General: No focal deficit present.      Mental Status: He is alert and oriented to person, place, and time.   Psychiatric:         Mood and Affect: Mood normal.         Behavior: Behavior normal.                                                       Cardiac testing:   EKG reviewed personally as performed on 6/10/24. My read: Normal sinus rhythm, rightward axis.    LABS:  Lab Results   Component Value Date    BUN 19 03/20/2023    CREATININE 0.89 03/20/2023    CALCIUM 9.1 03/20/2023    K 3.6 03/20/2023    CO2 28 03/20/2023     03/20/2023    ALKPHOS 76 03/20/2023    AST 16 03/20/2023    ALT 14 03/20/2023        Lab Results   Component Value Date    WBC 6.68 03/20/2023    HGB 13.7 03/20/2023    HCT 39.9 03/20/2023    MCV 91 03/20/2023     03/20/2023           Lab Results   Component Value Date    HGBA1C 5.1 11/03/2023       ASSESSMENT/PLAN:  Diagnoses and all orders for this visit:    Precordial pain  -     CTA cardiac; Future  -     Echo complete w/ contrast if indicated; Future  -     Basic metabolic panel; Future  -     metoprolol tartrate (LOPRESSOR) 25 mg tablet; Take 1 tablet (25 mg total) by mouth every 12 (twelve) hours Start 1 day prior to your cardiac CT scan.    PVCs (premature ventricular contractions)  -     Echo complete w/ contrast if indicated; Future  -     Basic metabolic panel; Future    Mixed hyperlipidemia  -     Lipoprotein A (LPA); Future    Family history of heart disease  -     CTA cardiac; Future  -     Echo complete w/ contrast if indicated; Future  -     Lipoprotein A (LPA); Future    Tobacco use disorder, mild, in sustained remission    Family history of sudden cardiac death  -     Echo complete w/ contrast if indicated; Future  -     Lipoprotein A (LPA); Future    Other orders  -     clindamycin (CLEOCIN T) 1 %; apply topically to affected area twice a day    Plan:  Cardiac CTA to assess coronary anatomy and exclude underlying occlusive coronary disease given low to  "intermediate pretest risk and atypical symptoms.  Resting transthoracic echocardiogram to establish a structural baseline given family history of sudden cardiac death, rightward axis deviation noted on EKG from June 2024, and ongoing chronic symptoms.  I have recommended obtaining a basic metabolic panel 1 week prior to his cardiac CTA to ensure stable renal function prior to contrast administration  Start Lopressor 25 mg twice daily 1 day prior to his CTA for goal heart rate of 55-65 bpm.  Does not have an indication for long-term beta-blocker therapy.  I recommended continuing atorvastatin 10 mg once daily.  Mild coronary calcium noted on a prior CT chest from 2023 his lipids are well-controlled.  Checking a one-time lipoprotein a level.          Cary Nava MD      Portions of the record may have been created with voice recognition software. Occasional wrong word or \"sound alike\" substitutions may have occurred due to the inherent limitations of voice recognition software. Please review the chart carefully and recognize, using context, where substitutions/typographical errors may have occurred.     "

## 2024-09-19 ENCOUNTER — HOSPITAL ENCOUNTER (OUTPATIENT)
Dept: CT IMAGING | Facility: HOSPITAL | Age: 39
Discharge: HOME | End: 2024-09-19
Attending: INTERNAL MEDICINE
Payer: COMMERCIAL

## 2024-09-19 ENCOUNTER — HOSPITAL ENCOUNTER (OUTPATIENT)
Dept: NON INVASIVE DIAGNOSTICS | Age: 39
Discharge: HOME | End: 2024-09-19
Payer: COMMERCIAL

## 2024-09-19 VITALS
DIASTOLIC BLOOD PRESSURE: 72 MMHG | SYSTOLIC BLOOD PRESSURE: 110 MMHG | HEIGHT: 70 IN | HEART RATE: 44 BPM | BODY MASS INDEX: 18.04 KG/M2 | WEIGHT: 126 LBS

## 2024-09-19 VITALS — DIASTOLIC BLOOD PRESSURE: 64 MMHG | SYSTOLIC BLOOD PRESSURE: 117 MMHG | HEART RATE: 45 BPM

## 2024-09-19 DIAGNOSIS — Z82.41 FAMILY HISTORY OF SUDDEN CARDIAC DEATH: ICD-10-CM

## 2024-09-19 DIAGNOSIS — Z82.49 FAMILY HISTORY OF HEART DISEASE: ICD-10-CM

## 2024-09-19 DIAGNOSIS — R07.2 PRECORDIAL PAIN: ICD-10-CM

## 2024-09-19 DIAGNOSIS — I49.3 PVCS (PREMATURE VENTRICULAR CONTRACTIONS): ICD-10-CM

## 2024-09-19 LAB
AORTIC ROOT: 3.1 CM
APICAL FOUR CHAMBER EJECTION FRACTION: 59 %
ASCENDING AORTA: 3.2 CM
BSA FOR ECHO PROCEDURE: 1.72 M2
DOP CALC LVOT AREA: 3.14 CM2
DOP CALC LVOT DIAMETER: 2 CM
E WAVE DECELERATION TIME: 184 MS
E/A RATIO: 2.11
FRACTIONAL SHORTENING: 28 (ref 28–44)
INTERVENTRICULAR SEPTUM IN DIASTOLE (PARASTERNAL SHORT AXIS VIEW): 0.6 CM
INTERVENTRICULAR SEPTUM: 0.6 CM (ref 0.6–1.1)
LAAS-AP2: 13.7 CM2
LAAS-AP4: 9.4 CM2
LEFT ATRIUM AREA SYSTOLE SINGLE PLANE A4C: 9.8 CM2
LEFT ATRIUM SIZE: 2.4 CM
LEFT ATRIUM VOLUME (MOD BIPLANE): 30 ML
LEFT ATRIUM VOLUME INDEX (MOD BIPLANE): 17.4 ML/M2
LEFT INTERNAL DIMENSION IN SYSTOLE: 3.4 CM (ref 2.1–4)
LEFT VENTRICULAR INTERNAL DIMENSION IN DIASTOLE: 4.7 CM (ref 3.5–6)
LEFT VENTRICULAR POSTERIOR WALL IN END DIASTOLE: 0.6 CM
LEFT VENTRICULAR STROKE VOLUME: 58 ML
LVSV (TEICH): 58 ML
MV E'TISSUE VEL-SEP: 16 CM/S
MV PEAK A VEL: 0.37 M/S
MV PEAK E VEL: 78 CM/S
MV STENOSIS PRESSURE HALF TIME: 53 MS
MV VALVE AREA P 1/2 METHOD: 4.15
RIGHT ATRIUM AREA SYSTOLE A4C: 15.4 CM2
RIGHT VENTRICLE ID DIMENSION: 3.6 CM
SL CV LEFT ATRIUM LENGTH A2C: 4.5 CM
SL CV LV EF: 60
SL CV PED ECHO LEFT VENTRICLE DIASTOLIC VOLUME (MOD BIPLANE) 2D: 104 ML
SL CV PED ECHO LEFT VENTRICLE SYSTOLIC VOLUME (MOD BIPLANE) 2D: 46 ML
TRICUSPID ANNULAR PLANE SYSTOLIC EXCURSION: 1.8 CM

## 2024-09-19 PROCEDURE — 93306 TTE W/DOPPLER COMPLETE: CPT

## 2024-09-19 PROCEDURE — 93306 TTE W/DOPPLER COMPLETE: CPT | Performed by: INTERNAL MEDICINE

## 2024-09-19 PROCEDURE — 75574 CT ANGIO HRT W/3D IMAGE: CPT

## 2024-09-19 RX ORDER — NITROGLYCERIN 0.4 MG/1
0.8 TABLET SUBLINGUAL
Status: DISCONTINUED | OUTPATIENT
Start: 2024-09-19 | End: 2024-09-20 | Stop reason: HOSPADM

## 2024-09-19 RX ORDER — METOPROLOL TARTRATE 1 MG/ML
5 INJECTION, SOLUTION INTRAVENOUS
Status: DISCONTINUED | OUTPATIENT
Start: 2024-09-19 | End: 2024-09-20 | Stop reason: HOSPADM

## 2024-09-19 RX ADMIN — NITROGLYCERIN 0.4 MG: 0.4 TABLET SUBLINGUAL at 10:51

## 2024-09-19 RX ADMIN — IOHEXOL 60 ML: 350 INJECTION, SOLUTION INTRAVENOUS at 11:20

## 2024-09-19 NOTE — NURSING NOTE
Patient arrived for coronary CT angiography. Test education reviewed with patient. Medications and allergies verified. Pt denies PDE5 inhibitor use. Patient took metoprolol as instructed by cardiology. SL nitro 0.4mg given due to hypotension. See vitals flowsheet. Tolerated test well. Instructed to increase fluid intake remainder of day. Vitals stable, patient asymptomatic upon departure.

## 2024-09-20 LAB
BUN SERPL-MCNC: 16 MG/DL (ref 7–25)
BUN/CREAT SERPL: NORMAL (CALC) (ref 6–22)
CALCIUM SERPL-MCNC: 10.2 MG/DL (ref 8.6–10.3)
CHLORIDE SERPL-SCNC: 103 MMOL/L (ref 98–110)
CO2 SERPL-SCNC: 30 MMOL/L (ref 20–32)
CREAT SERPL-MCNC: 0.81 MG/DL (ref 0.6–1.26)
GFR/BSA.PRED SERPLBLD CYS-BASED-ARV: 115 ML/MIN/1.73M2
GLUCOSE SERPL-MCNC: 84 MG/DL (ref 65–99)
LABORATORY COMMENT REPORT: NORMAL
LPA GENE.C.5673A>G [GENOTYPE] IN BLOOD OR TISSUE BY MOLECULAR GENETICS METHOD NOMINAL: NORMAL
MEDICAL DIRECTOR REVIEW: NORMAL
METHOD: NORMAL
POTASSIUM SERPL-SCNC: 4.4 MMOL/L (ref 3.5–5.3)
SERVICE CMNT-IMP: NORMAL
SODIUM SERPL-SCNC: 141 MMOL/L (ref 135–146)

## 2024-10-11 ENCOUNTER — NURSE TRIAGE (OUTPATIENT)
Age: 39
End: 2024-10-11

## 2024-10-11 NOTE — TELEPHONE ENCOUNTER
"Juan Antonio called his Psychiatric recommends try taking Adderall    From a cardiac standpoint is this safe?    Last ov 9/10/2024  Echo done on 9/19/2024    Please review  Reason for Disposition  • Caller has NON-URGENT medicine question about med that PCP or specialist prescribed and triager unable to answer question    Answer Assessment - Initial Assessment Questions  1. NAME of MEDICATION: \"What medicine are you calling about?\"      adder all  2. QUESTION: \"What is your question?\" (e.g., medication refill, side effect)   Psychiatric is recommending Adder all  Wants to know if these is safe from a cardiac standpoint    Protocols used: Medication Question Call-ADULT-[place]    "

## 2024-11-27 ENCOUNTER — APPOINTMENT (EMERGENCY)
Dept: RADIOLOGY | Facility: HOSPITAL | Age: 39
DRG: 200 | End: 2024-11-27
Payer: COMMERCIAL

## 2024-11-27 ENCOUNTER — HOSPITAL ENCOUNTER (INPATIENT)
Facility: HOSPITAL | Age: 39
LOS: 3 days | Discharge: HOME | DRG: 200 | End: 2024-11-30
Attending: EMERGENCY MEDICINE | Admitting: STUDENT IN AN ORGANIZED HEALTH CARE EDUCATION/TRAINING PROGRAM
Payer: COMMERCIAL

## 2024-11-27 DIAGNOSIS — J93.83 SPONTANEOUS PNEUMOTHORAX: ICD-10-CM

## 2024-11-27 DIAGNOSIS — J93.9 PNEUMOTHORAX: Primary | ICD-10-CM

## 2024-11-27 LAB
2HR DELTA HS TROPONIN: >1 NG/L
ANION GAP SERPL CALCULATED.3IONS-SCNC: 9 MMOL/L (ref 4–13)
ATRIAL RATE: 84 BPM
BASOPHILS # BLD AUTO: 0.08 THOUSANDS/ΜL (ref 0–0.1)
BASOPHILS NFR BLD AUTO: 1 % (ref 0–1)
BUN SERPL-MCNC: 15 MG/DL (ref 5–25)
CALCIUM SERPL-MCNC: 9.7 MG/DL (ref 8.4–10.2)
CARDIAC TROPONIN I PNL SERPL HS: 3 NG/L (ref ?–50)
CARDIAC TROPONIN I PNL SERPL HS: <2 NG/L (ref ?–50)
CHLORIDE SERPL-SCNC: 103 MMOL/L (ref 96–108)
CO2 SERPL-SCNC: 24 MMOL/L (ref 21–32)
CREAT SERPL-MCNC: 0.92 MG/DL (ref 0.6–1.3)
EOSINOPHIL # BLD AUTO: 0.35 THOUSAND/ΜL (ref 0–0.61)
EOSINOPHIL NFR BLD AUTO: 6 % (ref 0–6)
ERYTHROCYTE [DISTWIDTH] IN BLOOD BY AUTOMATED COUNT: 11.3 % (ref 11.6–15.1)
GFR SERPL CREATININE-BSD FRML MDRD: 104 ML/MIN/1.73SQ M
GLUCOSE SERPL-MCNC: 104 MG/DL (ref 65–140)
HCT VFR BLD AUTO: 45.4 % (ref 36.5–49.3)
HGB BLD-MCNC: 15.6 G/DL (ref 12–17)
IMM GRANULOCYTES # BLD AUTO: 0.02 THOUSAND/UL (ref 0–0.2)
IMM GRANULOCYTES NFR BLD AUTO: 0 % (ref 0–2)
LYMPHOCYTES # BLD AUTO: 2.4 THOUSANDS/ΜL (ref 0.6–4.47)
LYMPHOCYTES NFR BLD AUTO: 38 % (ref 14–44)
MCH RBC QN AUTO: 31.3 PG (ref 26.8–34.3)
MCHC RBC AUTO-ENTMCNC: 34.4 G/DL (ref 31.4–37.4)
MCV RBC AUTO: 91 FL (ref 82–98)
MONOCYTES # BLD AUTO: 0.78 THOUSAND/ΜL (ref 0.17–1.22)
MONOCYTES NFR BLD AUTO: 12 % (ref 4–12)
NEUTROPHILS # BLD AUTO: 2.77 THOUSANDS/ΜL (ref 1.85–7.62)
NEUTS SEG NFR BLD AUTO: 43 % (ref 43–75)
NRBC BLD AUTO-RTO: 0 /100 WBCS
P AXIS: 88 DEGREES
PLATELET # BLD AUTO: 331 THOUSANDS/UL (ref 149–390)
PMV BLD AUTO: 10.1 FL (ref 8.9–12.7)
POTASSIUM SERPL-SCNC: 5.1 MMOL/L (ref 3.5–5.3)
PR INTERVAL: 150 MS
QRS AXIS: 97 DEGREES
QRSD INTERVAL: 92 MS
QT INTERVAL: 364 MS
QTC INTERVAL: 431 MS
RBC # BLD AUTO: 4.99 MILLION/UL (ref 3.88–5.62)
SODIUM SERPL-SCNC: 136 MMOL/L (ref 135–147)
T WAVE AXIS: 41 DEGREES
VENTRICULAR RATE: 84 BPM
WBC # BLD AUTO: 6.4 THOUSAND/UL (ref 4.31–10.16)

## 2024-11-27 PROCEDURE — 99291 CRITICAL CARE FIRST HOUR: CPT | Performed by: EMERGENCY MEDICINE

## 2024-11-27 PROCEDURE — 85025 COMPLETE CBC W/AUTO DIFF WBC: CPT | Performed by: EMERGENCY MEDICINE

## 2024-11-27 PROCEDURE — 71045 X-RAY EXAM CHEST 1 VIEW: CPT

## 2024-11-27 PROCEDURE — 36415 COLL VENOUS BLD VENIPUNCTURE: CPT | Performed by: EMERGENCY MEDICINE

## 2024-11-27 PROCEDURE — 93010 ELECTROCARDIOGRAM REPORT: CPT | Performed by: INTERNAL MEDICINE

## 2024-11-27 PROCEDURE — 99255 IP/OBS CONSLTJ NEW/EST HI 80: CPT | Performed by: INTERNAL MEDICINE

## 2024-11-27 PROCEDURE — 84484 ASSAY OF TROPONIN QUANT: CPT | Performed by: EMERGENCY MEDICINE

## 2024-11-27 PROCEDURE — 99223 1ST HOSP IP/OBS HIGH 75: CPT | Performed by: INTERNAL MEDICINE

## 2024-11-27 PROCEDURE — 99285 EMERGENCY DEPT VISIT HI MDM: CPT

## 2024-11-27 PROCEDURE — 80048 BASIC METABOLIC PNL TOTAL CA: CPT | Performed by: EMERGENCY MEDICINE

## 2024-11-27 PROCEDURE — 0W9B30Z DRAINAGE OF LEFT PLEURAL CAVITY WITH DRAINAGE DEVICE, PERCUTANEOUS APPROACH: ICD-10-PCS | Performed by: EMERGENCY MEDICINE

## 2024-11-27 PROCEDURE — 93005 ELECTROCARDIOGRAM TRACING: CPT

## 2024-11-27 PROCEDURE — 84484 ASSAY OF TROPONIN QUANT: CPT | Performed by: INTERNAL MEDICINE

## 2024-11-27 PROCEDURE — 32557 INSERT CATH PLEURA W/ IMAGE: CPT | Performed by: EMERGENCY MEDICINE

## 2024-11-27 RX ORDER — ATORVASTATIN CALCIUM 10 MG/1
10 TABLET, FILM COATED ORAL DAILY
Status: DISCONTINUED | OUTPATIENT
Start: 2024-11-27 | End: 2024-11-30 | Stop reason: HOSPADM

## 2024-11-27 RX ORDER — BUPROPION HYDROCHLORIDE 300 MG/1
300 TABLET ORAL DAILY
Status: DISCONTINUED | OUTPATIENT
Start: 2024-11-27 | End: 2024-11-30 | Stop reason: HOSPADM

## 2024-11-27 RX ORDER — METOPROLOL TARTRATE 25 MG/1
25 TABLET, FILM COATED ORAL EVERY 12 HOURS SCHEDULED
Status: DISCONTINUED | OUTPATIENT
Start: 2024-11-27 | End: 2024-11-27

## 2024-11-27 RX ORDER — ALBUTEROL SULFATE 90 UG/1
2 INHALANT RESPIRATORY (INHALATION) EVERY 4 HOURS PRN
Status: DISCONTINUED | OUTPATIENT
Start: 2024-11-27 | End: 2024-11-30 | Stop reason: HOSPADM

## 2024-11-27 RX ORDER — OXYCODONE HYDROCHLORIDE 5 MG/1
5 TABLET ORAL EVERY 4 HOURS PRN
Refills: 0 | Status: DISCONTINUED | OUTPATIENT
Start: 2024-11-27 | End: 2024-11-30 | Stop reason: HOSPADM

## 2024-11-27 RX ORDER — LIDOCAINE HYDROCHLORIDE AND EPINEPHRINE 10; 10 MG/ML; UG/ML
20 INJECTION, SOLUTION INFILTRATION; PERINEURAL ONCE
Status: DISCONTINUED | OUTPATIENT
Start: 2024-11-27 | End: 2024-11-30 | Stop reason: HOSPADM

## 2024-11-27 RX ORDER — KETAMINE HYDROCHLORIDE 50 MG/ML
50 INJECTION, SOLUTION INTRAMUSCULAR; INTRAVENOUS ONCE
Status: DISCONTINUED | OUTPATIENT
Start: 2024-11-27 | End: 2024-11-27

## 2024-11-27 RX ORDER — HYDROMORPHONE HCL/PF 1 MG/ML
0.5 SYRINGE (ML) INJECTION ONCE
Refills: 0 | Status: COMPLETED | OUTPATIENT
Start: 2024-11-27 | End: 2024-11-27

## 2024-11-27 RX ORDER — HYDROMORPHONE HCL/PF 1 MG/ML
0.5 SYRINGE (ML) INJECTION EVERY 4 HOURS PRN
Status: DISCONTINUED | OUTPATIENT
Start: 2024-11-27 | End: 2024-11-30 | Stop reason: HOSPADM

## 2024-11-27 RX ORDER — QUETIAPINE FUMARATE 100 MG/1
100 TABLET, FILM COATED ORAL
Status: DISCONTINUED | OUTPATIENT
Start: 2024-11-27 | End: 2024-11-30 | Stop reason: HOSPADM

## 2024-11-27 RX ORDER — ONDANSETRON 2 MG/ML
4 INJECTION INTRAMUSCULAR; INTRAVENOUS EVERY 6 HOURS PRN
Status: DISCONTINUED | OUTPATIENT
Start: 2024-11-27 | End: 2024-11-30 | Stop reason: HOSPADM

## 2024-11-27 RX ORDER — KETAMINE HCL IN NACL, ISO-OSM 100MG/10ML
1 SYRINGE (ML) INJECTION ONCE
Status: COMPLETED | OUTPATIENT
Start: 2024-11-27 | End: 2024-11-27

## 2024-11-27 RX ORDER — FENTANYL CITRATE 50 UG/ML
1 INJECTION, SOLUTION INTRAMUSCULAR; INTRAVENOUS ONCE
Refills: 0 | Status: COMPLETED | OUTPATIENT
Start: 2024-11-27 | End: 2024-11-27

## 2024-11-27 RX ORDER — ACETAMINOPHEN 325 MG/1
650 TABLET ORAL EVERY 6 HOURS PRN
Status: DISCONTINUED | OUTPATIENT
Start: 2024-11-27 | End: 2024-11-30 | Stop reason: HOSPADM

## 2024-11-27 RX ORDER — KETAMINE HCL IN NACL, ISO-OSM 100MG/10ML
50 SYRINGE (ML) INJECTION ONCE
Status: COMPLETED | OUTPATIENT
Start: 2024-11-27 | End: 2024-11-27

## 2024-11-27 RX ADMIN — BUPROPION HYDROCHLORIDE 300 MG: 300 TABLET, FILM COATED, EXTENDED RELEASE ORAL at 10:09

## 2024-11-27 RX ADMIN — OXYCODONE HYDROCHLORIDE 5 MG: 5 TABLET ORAL at 23:45

## 2024-11-27 RX ADMIN — METOPROLOL TARTRATE 25 MG: 25 TABLET, FILM COATED ORAL at 10:08

## 2024-11-27 RX ADMIN — QUETIAPINE FUMARATE 100 MG: 100 TABLET ORAL at 20:37

## 2024-11-27 RX ADMIN — OXYCODONE HYDROCHLORIDE 5 MG: 5 TABLET ORAL at 14:50

## 2024-11-27 RX ADMIN — Medication 50 MG: at 08:27

## 2024-11-27 RX ADMIN — OXYCODONE HYDROCHLORIDE 5 MG: 5 TABLET ORAL at 19:45

## 2024-11-27 RX ADMIN — HYDROMORPHONE HYDROCHLORIDE 0.5 MG: 1 INJECTION, SOLUTION INTRAMUSCULAR; INTRAVENOUS; SUBCUTANEOUS at 09:32

## 2024-11-27 RX ADMIN — OXYCODONE HYDROCHLORIDE 5 MG: 5 TABLET ORAL at 10:08

## 2024-11-27 RX ADMIN — Medication 50 MG: at 08:22

## 2024-11-27 RX ADMIN — HYDROMORPHONE HYDROCHLORIDE 0.5 MG: 1 INJECTION, SOLUTION INTRAMUSCULAR; INTRAVENOUS; SUBCUTANEOUS at 17:33

## 2024-11-27 RX ADMIN — ATORVASTATIN CALCIUM 10 MG: 10 TABLET, FILM COATED ORAL at 15:32

## 2024-11-27 RX ADMIN — HYDROMORPHONE HYDROCHLORIDE 0.5 MG: 1 INJECTION, SOLUTION INTRAMUSCULAR; INTRAVENOUS; SUBCUTANEOUS at 13:23

## 2024-11-27 NOTE — ED PROVIDER NOTES
Time reflects when diagnosis was documented in both MDM as applicable and the Disposition within this note       Time User Action Codes Description Comment    11/27/2024  9:03 AM Femi Kaur Add [J93.9] Pneumothorax     11/29/2024  1:52 PM Sindi Cortez Add [J93.83] Spontaneous pneumothorax           ED Disposition       ED Disposition   Admit    Condition   Stable    Date/Time   Wed Nov 27, 2024  9:03 AM    Comment   Case was discussed with ELIAZAR and the patient's admission status was agreed to be Admission Status: inpatient status to the service of Dr. Edwards .               Assessment & Plan       Medical Decision Making  39-year-old male presents for evaluation of sudden onset left-sided chest pain.  Consider ACS, infectious etiology, pneumothorax.    Chest x-ray reveals moderate size left-sided pneumothorax.  Discussed risks and benefits of chest tube with patient and agreeable to proceed.        Amount and/or Complexity of Data Reviewed  Labs: ordered.  Radiology: ordered.    Risk  Prescription drug management.  Decision regarding hospitalization.             Medications   fentanyl citrate (PF) (FOR EMS ONLY) 100 mcg/2 mL injection 100 mcg (0 mcg Does not apply Given to EMS 11/27/24 0723)   Ketamine HCl 57 mg (50 mg Intravenous Given 11/27/24 0822)   Ketamine HCl 50 mg (50 mg Intravenous Given 11/27/24 0827)   HYDROmorphone (DILAUDID) injection 0.5 mg (0.5 mg Intravenous Given 11/27/24 0932)       ED Risk Strat Scores                           SBIRT 22yo+      Flowsheet Row Most Recent Value   Initial Alcohol Screen: US AUDIT-C     1. How often do you have a drink containing alcohol? 0 Filed at: 11/27/2024 0712   2. How many drinks containing alcohol do you have on a typical day you are drinking?  0 Filed at: 11/27/2024 0712   3a. Male UNDER 65: How often do you have five or more drinks on one occasion? 0 Filed at: 11/27/2024 0712   3b. FEMALE Any Age, or MALE 65+: How often do you have 4 or more drinks  on one occassion? 0 Filed at: 2024   Audit-C Score 0 Filed at: 2024   RASHARD: How many times in the past year have you...    Used an illegal drug or used a prescription medication for non-medical reasons? Never Filed at: 2024                            History of Present Illness       Chief Complaint   Patient presents with    Medical Problem     Pt to er via ems from work. States he tried meth for the first time last night, didn't sleep so he took aderrall this am. Complaining of chest pain.        Past Medical History:   Diagnosis Date    Anxiety     Asthma     Depression       Past Surgical History:   Procedure Laterality Date    CHEST WALL RECONSTRUCTION      WISDOM TOOTH EXTRACTION        History reviewed. No pertinent family history.   Social History     Tobacco Use    Smoking status: Former     Current packs/day: 0.00     Average packs/day: 1 pack/day for 22.8 years (22.8 ttl pk-yrs)     Types: Cigarettes     Start date: 2001     Quit date: 10/2/2023     Years since quittin.1    Smokeless tobacco: Never   Vaping Use    Vaping status: Never Used   Substance Use Topics    Alcohol use: Never    Drug use: Yes     Types: Marijuana, Methamphetamines      E-Cigarette/Vaping    E-Cigarette Use Never User       E-Cigarette/Vaping Substances    Nicotine No     THC No     CBD No     Flavoring No     Other No     Unknown No       I have reviewed and agree with the history as documented.     39 year old male presents for evaluation of sudden onset left-sided chest pain that started shortly prior to arrival.  Patient admits to abusing methamphetamine for the first time that was approximately 16 hours ago.  Patient was getting ready for work when symptoms started.  Denies having the symptoms previously.  EMS administered aspirin, nitro and fentanyl.        Review of Systems   Constitutional:  Negative for fever.   Cardiovascular:  Positive for chest pain.           Objective       ED  Triage Vitals   Temperature Pulse Blood Pressure Respirations SpO2 Patient Position - Orthostatic VS   11/27/24 0713 11/27/24 0713 11/27/24 0713 11/27/24 0713 11/27/24 0713 11/27/24 0818   97.7 °F (36.5 °C) 93 120/79 20 100 % Lying      Temp Source Heart Rate Source BP Location FiO2 (%) Pain Score    11/27/24 0713 11/27/24 0713 11/27/24 0818 -- 11/27/24 0818    Temporal Monitor Right arm  No Pain      Vitals      Date and Time Temp Pulse SpO2 Resp BP Pain Score FACES Pain Rating User   11/30/24 0900 -- -- -- -- -- No Pain -- EK   11/30/24 0830 -- -- -- 20 -- -- -- EK   11/30/24 0830 98.1 °F (36.7 °C) 60 96 % -- 112/64 -- -- DII   11/29/24 2300 -- -- -- -- -- No Pain -- AC   11/29/24 2207 98.1 °F (36.7 °C) 62 99 % 17 107/65 -- -- DII   11/29/24 1902 98.1 °F (36.7 °C) 61 98 % 20 113/65 -- -- DII   11/29/24 1519 97.5 °F (36.4 °C) 59 98 % -- 106/64 -- -- DII   11/29/24 1318 -- 66 97 % -- 99/63 -- -- DII   11/29/24 1243 -- -- 100 % -- -- -- -- DII   11/29/24 1240 -- -- -- -- -- 9 -- KD   11/29/24 1112 -- -- -- -- -- 5 -- KD   11/29/24 0800 -- -- -- -- -- 4 -- KD   11/29/24 0725 97.3 °F (36.3 °C) 55 96 % -- 100/62 -- -- DII   11/29/24 0456 -- -- -- -- -- 7 -- CD   11/29/24 0455 -- 52 100 % -- 99/59 -- -- DII   11/29/24 0133 -- -- -- -- 94/55 Blood pressure post IV fluid bolus. Provider made aware -- -- CD   11/29/24 0133 -- 51 100 % -- -- -- -- DII   11/29/24 0019 97.5 °F (36.4 °C) -- -- 14 88/54 Provider made aware -- -- CD   11/29/24 0017 -- 51 97 % -- 82/50 -- -- DII   11/28/24 2017 -- -- -- -- -- 7 -- CD   11/28/24 2014 -- -- -- 16 -- -- -- CD   11/28/24 2014 98.1 °F (36.7 °C) 73 97 % -- 104/68 -- -- DII   11/28/24 1902 98.6 °F (37 °C) 64 97 % -- 113/65 -- -- DII   11/28/24 1552 -- -- -- -- -- 6 -- KD   11/28/24 1539 97.5 °F (36.4 °C) 52 100 % -- 117/61 -- -- DII   11/28/24 0843 -- -- -- -- -- 8 -- KD   11/28/24 0755 97.3 °F (36.3 °C) 54 100 % -- 109/65 -- -- DII   11/28/24 0406 -- -- -- -- -- 7 -- CD   11/28/24 0356  97.2 °F (36.2 °C) 58 100 % -- 109/58 -- -- DII   11/28/24 0111 -- 41 Provider aware pt HR is 41 and ok with masimo parameter at 40. -- -- -- -- -- CD   11/28/24 0045 -- -- -- -- -- 9 -- CD   11/27/24 2345 -- -- -- -- -- 7 -- CD   11/27/24 2332 97.5 °F (36.4 °C) 53 100 % -- 101/55 -- -- DII   11/27/24 1949 -- 49 100 % -- 110/67 -- -- DII   11/27/24 1945 -- -- -- -- -- 8 -- CD   11/27/24 1900 -- 48 Masimo parameter in room altered to low of 45. Provider aware and discussed over Secure[employer]. -- -- -- -- -- JL   11/27/24 1733 -- -- -- -- -- 8 -- SA   11/27/24 1450 -- -- -- -- -- 8 -- JL   11/27/24 1441 -- -- -- 22 -- -- -- LH   11/27/24 1441 98.2 °F (36.8 °C) 58 99 % -- 107/62 -- -- DII   11/27/24 1330 -- -- -- -- -- 8 -- AL   11/27/24 1323 -- -- -- -- -- 8 -- AL   11/27/24 1313 -- -- -- -- -- 7 -- AL   11/27/24 1311 99 °F (37.2 °C) 54 100 % -- 128/77 -- -- DII   11/27/24 1308 99 °F (37.2 °C) -- -- 32 Nurse aware -- -- -- LH   11/27/24 1308 -- 55 100 % -- 128/77 -- -- DII   11/27/24 1030 -- 63 100 % 20 129/72 -- -- MB   11/27/24 1008 -- -- -- -- 133/79 9 -- MG   11/27/24 0932 -- -- -- -- -- 8 -- MG   11/27/24 0845 -- 93 100 % 18 141/90 No Pain -- CAC   11/27/24 0842 -- 97 100 % 18 141/90 -- -- CAC   11/27/24 0836 -- 114 100 % 17 148/94 -- -- CAC   11/27/24 0835 -- 113 100 % 18 148/94 -- -- CAC   11/27/24 0830 -- 139 100 % 18 -- -- -- CAC   11/27/24 0830 -- 142 100 % 18 146/92 -- -- CAC   11/27/24 0826 -- 132 100 % 20 136/85 -- -- CAC   11/27/24 0824 -- 114 100 % 18 136/85 -- -- CAC   11/27/24 0818 -- 71 100 % 15 103/62 No Pain -- CAC   11/27/24 0817 -- 71 100 % 15 103/62 -- -- CAC   11/27/24 0713 97.7 °F (36.5 °C) 93 100 % 20 120/79 -- -- HR            Physical Exam  Vitals and nursing note reviewed.   Constitutional:       General: He is not in acute distress.     Appearance: He is well-developed.   HENT:      Head: Normocephalic and atraumatic.      Right Ear: External ear normal.      Left Ear: External ear normal.       Nose: Nose normal.   Eyes:      General: No scleral icterus.  Pulmonary:      Effort: Pulmonary effort is normal. No respiratory distress.      Comments: Decreased breath sounds of left upper lobe  Abdominal:      General: There is no distension.      Palpations: Abdomen is soft.   Musculoskeletal:         General: No deformity. Normal range of motion.      Cervical back: Normal range of motion and neck supple.   Skin:     General: Skin is warm.      Findings: No rash.   Neurological:      General: No focal deficit present.      Mental Status: He is alert.      Gait: Gait normal.   Psychiatric:         Mood and Affect: Mood normal.         Results Reviewed       Procedure Component Value Units Date/Time    HS Troponin I 2hr [[id number]]  (Normal) Collected: 11/27/24 1114    Lab Status: Final result Specimen: Blood from Arm, Right Updated: 11/27/24 1142     hs TnI 2hr 3 ng/L      Delta 2hr hsTnI >1 ng/L     Basic metabolic panel [[id number]] Collected: 11/27/24 0745    Lab Status: Final result Specimen: Blood from Arm, Right Updated: 11/27/24 0805     Sodium 136 mmol/L      Potassium 5.1 mmol/L      Chloride 103 mmol/L      CO2 24 mmol/L      ANION GAP 9 mmol/L      BUN 15 mg/dL      Creatinine 0.92 mg/dL      Glucose 104 mg/dL      Calcium 9.7 mg/dL      eGFR 104 ml/min/1.73sq m     Narrative:      [employer] guidelines for Chronic Kidney Disease (CKD):     Stage 1 with normal or high GFR (GFR > 90 mL/min/1.73 square meters)    Stage 2 Mild CKD (GFR = 60-89 mL/min/1.73 square meters)    Stage 3A Moderate CKD (GFR = 45-59 mL/min/1.73 square meters)    Stage 3B Moderate CKD (GFR = 30-44 mL/min/1.73 square meters)    Stage 4 Severe CKD (GFR = 15-29 mL/min/1.73 square meters)    Stage 5 End Stage CKD (GFR <15 mL/min/1.73 square meters)  Note: GFR calculation is accurate only with a steady state creatinine    HS Troponin 0hr (reflex protocol) [[id number]]  (Normal) Collected: 11/27/24 0723    Lab  Status: Final result Specimen: Blood from Arm, Left Updated: 11/27/24 0754     hs TnI 0hr <2 ng/L     CBC and differential [[id number]]  (Abnormal) Collected: 11/27/24 0723    Lab Status: Final result Specimen: Blood from Arm, Left Updated: 11/27/24 0732     WBC 6.40 Thousand/uL      RBC 4.99 Million/uL      Hemoglobin 15.6 g/dL      Hematocrit 45.4 %      MCV 91 fL      MCH 31.3 pg      MCHC 34.4 g/dL      RDW 11.3 %      MPV 10.1 fL      Platelets 331 Thousands/uL      nRBC 0 /100 WBCs      Segmented % 43 %      Immature Grans % 0 %      Lymphocytes % 38 %      Monocytes % 12 %      Eosinophils Relative 6 %      Basophils Relative 1 %      Absolute Neutrophils 2.77 Thousands/µL      Absolute Immature Grans 0.02 Thousand/uL      Absolute Lymphocytes 2.40 Thousands/µL      Absolute Monocytes 0.78 Thousand/µL      Eosinophils Absolute 0.35 Thousand/µL      Basophils Absolute 0.08 Thousands/µL             XR chest portable   Final Interpretation by Lyndsay Kelly MD (11/30 0925)      Stable small left apical pneumothorax.               Workstation performed: [id number]         XR chest portable   Final Interpretation by Humza Wesley MD (11/29 1622)      Small left apical pneumothorax slightly larger than the study from earlier today.      The study was marked in EPIC for immediate notification.      Workstation performed: [id number]         XR chest portable   Final Interpretation by Ruben Mallory DO (11/29 1246)      Stable trace left apical pneumothorax.            Workstation performed: [id number]         XR chest portable   Final Interpretation by Ruben Mallory DO (11/29 0917)      Trace left apical pneumothorax. Left-sided chest tube is in stable position.      The study was marked in EPIC for immediate notification.      Workstation performed: [id number]         XR chest portable   Final Interpretation by Mariusz Davenport MD (11/28 8915)      No acute cardiopulmonary disease. Left  chest tube in place without residual pneumothorax.            Workstation performed: [id number]         XR chest 1 view portable   Final Interpretation by Ruben Mendiola MD (11/27 1008)      Interval left apical chest tube placement with resolution of pneumothorax.            Resident: Rajan Sanderson I, the attending radiologist, have reviewed the images and agree with the final report above.      Workstation performed: [id number]         X-ray chest 1 view portable   Final Interpretation by Lyndsay Kelly MD (11/27 0728)      Moderate left pneumothorax.      I personally discussed this study with Dr. BENI KAUR on 11/27/2024 7:28 AM.                  Workstation performed: [id number]             CriticalCare Time    Date/Time: 11/27/2024 7:30 AM    Performed by: Beni Kaur DO  Authorized by: Beni Kaur DO    Critical care provider statement:     Critical care time (minutes):  34    Critical care time was exclusive of:  Separately billable procedures and treating other patients and teaching time    Critical care was necessary to treat or prevent imminent or life-threatening deterioration of the following conditions:  Respiratory failure    Critical care was time spent personally by me on the following activities:  Blood draw for specimens, obtaining history from patient or surrogate, development of treatment plan with patient or surrogate, discussions with primary provider, evaluation of patient's response to treatment, examination of patient, ordering and performing treatments and interventions, ordering and review of radiographic studies, ordering and review of laboratory studies, review of old charts and re-evaluation of patient's condition  Chest Tube    Date/Time: 11/27/2024 8:00 AM    Performed by: Beni Kaur DO  Authorized by: Beni Kaur DO    Patient location:  ED  Consent:     Consent obtained:  Verbal    Consent given by:  Patient    Alternatives discussed:  No treatment  Universal  protocol:     Procedure explained and questions answered to patient or proxy's satisfaction: yes      Patient identity confirmed:  Verbally with patient  Pre-procedure details:     Skin preparation:  ChloraPrep  Indications:     Indications: pneumothroax    Sedation:     Sedation type:  Moderate (conscious) sedation (See separate Procedural Sedation form)  Anesthesia (see MAR for exact dosages):     Anesthesia method:  Local infiltration    Local anesthetic:  Lidocaine 1% w/o epi  Procedure details:     Placement location:  Lateral    Laterality:  Left    Approach:  Percutaneous    Scalpel size:  11    Access kit used: 28.    Tube size (Fr):  28    Ultrasound guidance: no      Tension pneumothorax: no      Needle Decompression: no      Tube connected to:  Suction    Drainage characteristics:  Air only    Suture material:  0 silk    Dressing:  4x4 sterile gauze and Xeroform gauze  Post-procedure details:     Post-insertion x-ray findings: tube in good position      Patient tolerance of procedure:  Tolerated well, no immediate complications      ED Medication and Procedure Management   Prior to Admission Medications   Prescriptions Last Dose Informant Patient Reported? Taking?   QUEtiapine (SEROquel) 100 mg tablet  Self Yes No   Sig: Take 100 mg by mouth daily at bedtime   Sodium Fluoride 5000 PPM 1.1 % PSTE  Self Yes No   Sig: BRUSH TEETH AT BEDTIME   albuterol (PROVENTIL HFA,VENTOLIN HFA) 90 mcg/act inhaler  Self Yes No   Sig: if needed for wheezing or shortness of breath   atorvastatin (LIPITOR) 10 mg tablet  Self Yes No   Sig: Take 10 mg by mouth daily   buPROPion (WELLBUTRIN XL) 300 mg 24 hr tablet  Self Yes No   Sig: Take 300 mg by mouth in the morning   clindamycin (CLEOCIN T) 1 %  Self Yes No   Sig: apply topically to affected area twice a day   lithium 300 MG tablet  Self Yes No   Sig: Take 300 mg by mouth 2 (two) times a day   Patient not taking: Reported on 9/10/2024   metoprolol tartrate (LOPRESSOR) 25 mg  tablet Not Taking  No No   Sig: Take 1 tablet (25 mg total) by mouth every 12 (twelve) hours Start 1 day prior to your cardiac CT scan.   Patient not taking: Reported on 11/27/2024      Facility-Administered Medications: None     Discharge Medication List as of 11/30/2024 10:40 AM        START taking these medications    Details   oxyCODONE (ROXICODONE) 5 immediate release tablet Take 1 tablet (5 mg total) by mouth every 6 (six) hours as needed for moderate pain or severe pain for up to 7 days Max Daily Amount: 20 mg, Starting Fri 11/29/2024, Until Fri 12/6/2024 at 2359, Normal           CONTINUE these medications which have NOT CHANGED    Details   albuterol (PROVENTIL HFA,VENTOLIN HFA) 90 mcg/act inhaler if needed for wheezing or shortness of breath, Starting Tue 9/12/2023, Historical Med      atorvastatin (LIPITOR) 10 mg tablet Take 10 mg by mouth daily, Starting Sat 9/9/2023, Historical Med      buPROPion (WELLBUTRIN XL) 300 mg 24 hr tablet Take 300 mg by mouth in the morning, Starting Fri 5/17/2024, Historical Med      QUEtiapine (SEROquel) 100 mg tablet Take 100 mg by mouth daily at bedtime, Starting Tue 5/14/2024, Historical Med           STOP taking these medications       clindamycin (CLEOCIN T) 1 % Comments:   Reason for Stopping:         lithium 300 MG tablet Comments:   Reason for Stopping:         metoprolol tartrate (LOPRESSOR) 25 mg tablet Comments:   Reason for Stopping:         Sodium Fluoride 5000 PPM 1.1 % PSTE Comments:   Reason for Stopping:             Outpatient Discharge Orders   Ambulatory referral to Pulmonology   Standing Status: Future Standing Exp. Date: 11/30/25      No strenuous exercise     Call provider for:  difficulty breathing, headache or visual disturbances     Call provider for:  redness, tenderness, or signs of infection (pain, swelling, redness, odor or green/yellow discharge around incision site)     ED SEPSIS DOCUMENTATION   Time reflects when diagnosis was documented in  both MDM as applicable and the Disposition within this note       Time User Action Codes Description Comment    11/27/2024  9:03 AM Femi Kaur [J93.9] Pneumothorax     11/29/2024  1:52 PM Sindi Cortez [J93.83] Spontaneous pneumothorax                  Femi Kaur,   11/27/24 1457       Femi Kaur,   12/03/24 1129

## 2024-11-27 NOTE — H&P
H&P - Hospitalist   Name: Juan Antonio Cesar 39 y.o. male I MRN: [id number]  Unit/Bed#: ED 01 I Date of Admission: 11/27/2024   Date of Service: 11/27/2024 I Hospital Day: 0     Assessment & Plan  Spontaneous pneumothorax  Patient presented to the ER after sudden onset of chest pressure, difficulty breathing  Found to have left sided moderate pneumothorax on chest x-ray  Status post chest tube insertion    Supplemental oxygen as needed  Will consult pulmonology for chest tube management  As needed oxycodone and Dilaudid for pain management    Moderate episode of recurrent major depressive disorder (HCC)  Previously on lithium but no longer taking    Continue home Seroquel 100 mg nightly for mood and sleep  Continue Wellbutrin drainage milligrams daily  Monitor mood  Tobacco use disorder, mild, in sustained remission  Quit smoking 1 year      VTE Pharmacologic Prophylaxis: VTE Score: 1 Low Risk (Score 0-2) - Encourage Ambulation.  Code Status: Level 1 - Full Code   Discussion with family: Patient declined call to .     Anticipated Length of Stay: Patient will be admitted on an inpatient basis with an anticipated length of stay of greater than 2 midnights secondary to spontaneous pneumothorax.    History of Present Illness   Chief Complaint: Chest pain    Juan Antonio Cesar is a 39 y.o. male with a PMH of mood disorder, depression, anxiety, prior tobacco abuse, prior cannabis use,  who presents with chest pain.  Patient was at work this morning when he went to use the bathroom.  After passing a bowel movement he subsequently developed sudden onset of chest pressure and difficulty breathing.  The pain did not radiate and was not associated with nausea, diaphoresis, or lightheadedness or dizziness.  The patient felt like he was having a heart attack.  He was brought to the emergency room by EMS.  In the emergency room he was found to have a left-sided spontaneous pneumothorax.  Chest tube was placed  successfully.  At the time my evaluation patient's respiratory status is stable though he is in significant pain at near the site of the chest tube.  Otherwise patient was alert and oriented.  All questions and concerns were addressed.  Will be admitted for ongoing management.    REVIEW OF SYSTEMS  General Denies fevers or chills. Denies generalized weakness or fatigue.    HEENT Denies hearing or vision changes.   Cardiovascular Positive for chest pain. Denies LE swelling. Denies palpitations. Denies dyspnea on exertion.   Respiratory Denies cough.  Positive for difficulty breathing.  Positive for shortness of breath.   Genitourinary Denies hematuria. Denies dysuria. Denies difficulty voiding.Denies incontinence.   Gastrointestinal Denies nausea, vomiting, or diarrhea. Denies hematochezia, melena, or hematemesis.    Musculoskeletal Denies arthralgias or myalgias. Denies joint swelling.   Psychiatric  Denies changes in mood. Denies anxiety or depression.   Neurologic Denies headache. Denies lightheadedness/dizziness.Denies numbness/tingling. Denies weakness.   Endocrine Denies weight loss or weight gain. Denies excessive thirst, sweating, urination.         Historical Information   Past Medical History:   Diagnosis Date    Anxiety     Asthma     Depression      Past Surgical History:   Procedure Laterality Date    CHEST WALL RECONSTRUCTION      WISDOM TOOTH EXTRACTION       Social History     Tobacco Use    Smoking status: Former     Current packs/day: 0.00     Average packs/day: 1 pack/day for 22.8 years (22.8 ttl pk-yrs)     Types: Cigarettes     Start date: 2001     Quit date: 10/2/2023     Years since quittin.1    Smokeless tobacco: Never   Vaping Use    Vaping status: Never Used   Substance and Sexual Activity    Alcohol use: Never    Drug use: Yes     Types: Marijuana, Methamphetamines    Sexual activity: Not on file     E-Cigarette/Vaping    E-Cigarette Use Never User      E-Cigarette/Vaping Substances     Nicotine No     THC No     CBD No     Flavoring No     Other No     Unknown No      History reviewed. No pertinent family history.  Social History:  Marital Status: Single   Occupation:   Patient Pre-hospital Living Situation: Home  Patient Pre-hospital Level of Mobility: walks  Patient Pre-hospital Diet Restrictions: none    Meds/Allergies   I have reviewed home medications using recent Epic encounter.  Prior to Admission medications    Medication Sig Start Date End Date Taking? Authorizing Provider   albuterol (PROVENTIL HFA,VENTOLIN HFA) 90 mcg/act inhaler if needed for wheezing or shortness of breath 9/12/23   Historical Provider, MD   atorvastatin (LIPITOR) 10 mg tablet Take 10 mg by mouth daily 9/9/23   Historical Provider, MD   buPROPion (WELLBUTRIN XL) 300 mg 24 hr tablet Take 300 mg by mouth in the morning 5/17/24   Historical Provider, MD   clindamycin (CLEOCIN T) 1 % apply topically to affected area twice a day 8/1/24   Historical Provider, MD   lithium 300 MG tablet Take 300 mg by mouth 2 (two) times a day  Patient not taking: Reported on 9/10/2024 5/17/24   Historical Provider, MD   metoprolol tartrate (LOPRESSOR) 25 mg tablet Take 1 tablet (25 mg total) by mouth every 12 (twelve) hours Start 1 day prior to your cardiac CT scan. 9/10/24   Cary Nava MD   QUEtiapine (SEROquel) 100 mg tablet Take 100 mg by mouth daily at bedtime 5/14/24   Historical Provider, MD   Sodium Fluoride 5000 PPM 1.1 % PSTE BRUSH TEETH AT BEDTIME 3/11/24   Historical Provider, MD     No Known Allergies    Objective :  Temp:  [97.7 °F (36.5 °C)] 97.7 °F (36.5 °C)  HR:  [] 93  BP: (103-148)/(62-94) 141/90  Resp:  [15-20] 18  SpO2:  [100 %] 100 %  O2 Device: Nasal cannula  Nasal Cannula O2 Flow Rate (L/min):  [2 L/min] 2 L/min  PHYSICAL EXAM:    Vitals signs reviewed  Constitutional   Awake and cooperative. NAD.   Head/Neck   Normocephalic. Atraumatic.   HEENT   No scleral icterus. EOMI.   Heart   Regular rate and  rhythm. No murmers.   Lungs   Clear to auscultation bilaterally. Respirations unlaboured.  Chest tube inserted in the left anterolateral thorax.   Abdomen   Soft. Nontender. Nondistended.    Skin   Skin color normal. No rashes.   Extremities   No deformities. No peripheral edema.   Neuro   Alert and oriented. No new deficits.   Psych   Mood stable. Affect normal.         Lines/Drains:            Lab Results: I have reviewed the following results:  Results from last 7 days   Lab Units 11/27/24  0723   WBC Thousand/uL 6.40   HEMOGLOBIN g/dL 15.6   HEMATOCRIT % 45.4   PLATELETS Thousands/uL 331   SEGS PCT % 43   LYMPHO PCT % 38   MONO PCT % 12   EOS PCT % 6     Results from last 7 days   Lab Units 11/27/24  0745   SODIUM mmol/L 136   POTASSIUM mmol/L 5.1   CHLORIDE mmol/L 103   CO2 mmol/L 24   BUN mg/dL 15   CREATININE mg/dL 0.92   ANION GAP mmol/L 9   CALCIUM mg/dL 9.7   GLUCOSE RANDOM mg/dL 104             Lab Results   Component Value Date    HGBA1C 5.1 11/03/2023           Imaging Results Review: I reviewed radiology reports from this admission including: chest xray.  Other Study Results Review: EKG was reviewed.     Administrative Statements   I have spent a total time of 55 minutes in caring for this patient on the day of the visit/encounter including Diagnostic results, Prognosis, Risks and benefits of tx options, Instructions for management, Patient and family education, Impressions, Counseling / Coordination of care, Documenting in the medical record, Reviewing / ordering tests, medicine, procedures  , Obtaining or reviewing history  , and Communicating with other healthcare professionals .    ** Please Note: This note has been constructed using a voice recognition system. **

## 2024-11-27 NOTE — ASSESSMENT & PLAN NOTE
Previously on lithium but no longer taking    Continue home Seroquel 100 mg nightly for mood and sleep  Continue Wellbutrin drainage milligrams daily  Monitor mood

## 2024-11-27 NOTE — ASSESSMENT & PLAN NOTE
Currently on 2L oxygen. Can maintain therapeutically for now  CXR with moderate left pneumothorax  IR chest tube placed. Repeat CXR with resolution of pneumothorax  Plan to continue chest tube to suction overnight. If he remains stable, will likely place on waterseal in AM

## 2024-11-27 NOTE — ASSESSMENT & PLAN NOTE
Patient presented to the ER after sudden onset of chest pressure, difficulty breathing  Found to have left sided moderate pneumothorax on chest x-ray  Status post chest tube insertion    Supplemental oxygen as needed  Will consult pulmonology for chest tube management  As needed oxycodone and Dilaudid for pain management

## 2024-11-27 NOTE — CASE MANAGEMENT
Case Management Assessment & Discharge Planning Note    Patient name Juan Antonio Cesar  Location /-01 MRN [id number]  : 1985 Date 2024       Current Admission Date: 2024  Current Admission Diagnosis:Spontaneous pneumothorax   Patient Active Problem List    Diagnosis Date Noted Date Diagnosed    Spontaneous pneumothorax 2024     Family history of sudden cardiac death 09/10/2024     PVCs (premature ventricular contractions) 2024     Sleep apnea in adult 2024     Cannabis use disorder, moderate, in early remission (HCC) 2023     Long term current use of antipsychotic medication 2023     Tobacco use disorder, mild, in sustained remission 2023     Alcohol use disorder in remission 2023     PTSD (post-traumatic stress disorder) 2023     Heroin use disorder, severe, in sustained remission (HCC) 2023     Autism spectrum disorder 10/17/2022     Generalized anxiety disorder 10/17/2022     Moderate episode of recurrent major depressive disorder (HCC) 10/17/2022       LOS (days): 0  Geometric Mean LOS (GMLOS) (days):   Days to GMLOS:     OBJECTIVE:    Risk of Unplanned Readmission Score: 11.88         Current admission status: Inpatient       Preferred Pharmacy:   [id number] - [place], [place] - [address] [address]  Phone: [phone] Fax: [phone]    Primary Care Provider: Lizabeth Valdez DO    Primary Insurance: [employer]ET[employer]  Secondary Insurance:     ASSESSMENT:  Active Health Care Proxies    There are no active Health Care Proxies on file.       Advance Directives  Does patient have a Health Care POA?: No  Was patient offered paperwork?: Yes (declined)  Does patient currently have a Health Care decision maker?: Yes, please see Health Care Proxy section  Does patient have Advance Directives?: No  Was patient offered paperwork?: Yes (declined)  Primary Contact: Len  father Cesar         Readmission Root Cause  30 Day Readmission: No    Patient Information  Admitted from:: Home  Mental Status: Alert  During Assessment patient was accompanied by: Not accompanied during assessment  Assessment information provided by:: Patient  Primary Caregiver: Self  Support Systems: Self  County of Residence: [place]  What city do you live in?: [place]  Home entry access options. Select all that apply.: Stairs  Number of steps to enter home.: One Flight  Do the steps have railings?: Yes  Type of Current Residence: Apartment  Floor Level: 2  Upon entering residence, is there a bedroom on the main floor (no further steps)?: Yes  Upon entering residence, is there a bathroom on the main floor (no further steps)?: Yes  Living Arrangements: Lives Alone    Activities of Daily Living Prior to Admission  Functional Status: Independent  Completes ADLs independently?: Yes  Ambulates independently?: Yes  Does patient use assisted devices?: No  Does patient currently own DME?: No  Does patient have a history of Outpatient Therapy (PT/OT)?: No  Does the patient have a history of Short-Term Rehab?: No  Does patient have a history of HHC?: No  Does patient currently have HHC?: No         Patient Information Continued  Income Source: Employed  Does patient have prescription coverage?: Yes  Does patient receive dialysis treatments?: No  Does patient have a history of substance abuse?: No  Does patient have a history of Mental Health Diagnosis?: Yes (Depression)  Is patient receiving treatment for mental health?: No. Patient declined treatment information.  Has patient received inpatient treatment related to mental health in the last 2 years?: Yes (2 yrs ago)         Means of Transportation  Means of Transport to App:: Drives Self          DISCHARGE DETAILS:    Discharge planning discussed with:: Patient  Freedom of Choice: Yes     CM contacted family/caregiver?: No- see comments (Pt is alert and  oriented)  Were Treatment Team discharge recommendations reviewed with patient/caregiver?: Yes  Did patient/caregiver verbalize understanding of patient care needs?: Yes  Were patient/caregiver advised of the risks associated with not following Treatment Team discharge recommendations?: Yes         Requested Home Health Care         Is the patient interested in HHC at discharge?: No    DME Referral Provided  Referral made for DME?: No              Treatment Team Recommendation: Home  Discharge Destination Plan:: Home  Transport at Discharge : Family                                      Additional Comments: CM met with pt to discuss role of CM and any needs prior to discharge. [facility] w/ 1 flight of stairs. Indp PTA. No DME. No hx OP PT/STR/HH/DA. Hx depression and a psych stay about 2 yrs ago. Pt is employed and drives. Pt prefers to use [facility] in [place]. Father will transport at discharge.

## 2024-11-27 NOTE — PLAN OF CARE
Problem: PAIN - ADULT  Goal: Verbalizes/displays adequate comfort level or baseline comfort level  Description: Interventions:  - Encourage patient to monitor pain and request assistance  - Assess pain using appropriate pain scale  - Administer analgesics based on type and severity of pain and evaluate response  - Implement non-pharmacological measures as appropriate and evaluate response  - Consider cultural and social influences on pain and pain management  - Notify physician/advanced practitioner if interventions unsuccessful or patient reports new pain  Outcome: Progressing     Problem: INFECTION - ADULT  Goal: Absence or prevention of progression during hospitalization  Description: INTERVENTIONS:  - Assess and monitor for signs and symptoms of infection  - Monitor lab/diagnostic results  - Monitor all insertion sites, i.e. indwelling lines, tubes, and drains  - Monitor endotracheal if appropriate and nasal secretions for changes in amount and color  - [place] appropriate cooling/warming therapies per order  - Administer medications as ordered  - Instruct and encourage patient and family to use good hand hygiene technique  - Identify and instruct in appropriate isolation precautions for identified infection/condition  Outcome: Progressing  Goal: Absence of fever/infection during neutropenic period  Description: INTERVENTIONS:  - Monitor WBC    Outcome: Progressing     Problem: SAFETY ADULT  Goal: Patient will remain free of falls  Description: INTERVENTIONS:  - Educate patient/family on patient safety including physical limitations  - Instruct patient to call for assistance with activity   - Consult OT/PT to assist with strengthening/mobility   - Keep Call bell within reach  - Keep bed low and locked with side rails adjusted as appropriate  - Keep care items and personal belongings within reach  - Initiate and maintain comfort rounds  - Apply yellow socks and bracelet for high fall risk patients  - Consider  moving patient to room near nurses station  Outcome: Progressing  Goal: Maintain or return to baseline ADL function  Description: INTERVENTIONS:  -  Assess patient's ability to carry out ADLs; assess patient's baseline for ADL function and identify physical deficits which impact ability to perform ADLs (bathing, care of mouth/teeth, toileting, grooming, dressing, etc.)  - Assess/evaluate cause of self-care deficits   - Assess range of motion  - Assess patient's mobility; develop plan if impaired  - Assess patient's need for assistive devices and provide as appropriate  - Encourage maximum independence but intervene and supervise when necessary  - Involve family in performance of ADLs  - Assess for home care needs following discharge   - Consider OT consult to assist with ADL evaluation and planning for discharge  - Provide patient education as appropriate  Outcome: Progressing  Goal: Maintains/Returns to pre admission functional level  Description: INTERVENTIONS:  - Perform AM-PAC 6 Click Basic Mobility/ Daily Activity assessment daily.  - Set and communicate daily mobility goal to care team and patient/family/caregiver.   - Collaborate with rehabilitation services on mobility goals if consulted  - Out of bed for toileting  - Record patient progress and toleration of activity level   Outcome: Progressing     Problem: DISCHARGE PLANNING  Goal: Discharge to home or other facility with appropriate resources  Description: INTERVENTIONS:  - Identify barriers to discharge w/patient and caregiver  - Arrange for needed discharge resources and transportation as appropriate  - Identify discharge learning needs (meds, wound care, etc.)  - Arrange for interpretive services to assist at discharge as needed  - Refer to Case Management Department for coordinating discharge planning if the patient needs post-hospital services based on physician/advanced practitioner order or complex needs related to functional status, cognitive  ability, or social support system  Outcome: Progressing     Problem: Knowledge Deficit  Goal: Patient/family/caregiver demonstrates understanding of disease process, treatment plan, medications, and discharge instructions  Description: Complete learning assessment and assess knowledge base.  Interventions:  - Provide teaching at level of understanding  - Provide teaching via preferred learning methods  Outcome: Progressing

## 2024-11-27 NOTE — ASSESSMENT & PLAN NOTE
Former smoker, quit 13 months ago. Has intermittently vaped since quitting smoking  Encouraged continued complete smoking cessation

## 2024-11-27 NOTE — CONSULTS
Consultation - Pulmonology   Name: Juan Antonio Cesar 39 y.o. male I MRN: [id number]  Unit/Bed#: ED 01 I Date of Admission: 11/27/2024   Date of Service: 11/27/2024 I Hospital Day: 0   Inpatient consult to Pulmonology  Consult performed by: CLAUDIO Alas  Consult ordered by: Jamil Brown DO        Physician Requesting Evaluation: Jamil Brown, *   Reason for Evaluation / Principal Problem: pneumothorax    Assessment & Plan  Spontaneous pneumothorax  Currently on 2L oxygen. Can maintain therapeutically for now  CXR with moderate left pneumothorax  IR chest tube placed. Repeat CXR with resolution of pneumothorax  Plan to continue chest tube to suction overnight. If he remains stable, will likely place on waterseal in AM   Tobacco use disorder, mild, in sustained remission  Former smoker, quit 13 months ago. Has intermittently vaped since quitting smoking  Encouraged continued complete smoking cessation  Moderate episode of recurrent major depressive disorder (HCC)  Management per primary team  Pulmonology service will follow.    History of Present Illness   Juan Antonio Cesar is a 39 y.o. male with PMH former tobacco abuse, MICHELLE intolerant of CPAP, depression who presents with acute onset chest pain.  Patient states he was at work this morning, went in the bathroom bearing down to have a bowel movement and developed sudden onset chest pressure and shortness of breath.  Brought in to the ED by EMS, found to have left pneumothorax.    Patient states this is the first pneumothorax he has ever had.  He does have a history of smoking for about 22 pack years.  He quit 13 months ago.  He did start vaping after quitting cigarettes.  However he states he does not vape very often and was cutting back.    Currently he reports chest discomfort at chest tube insertion site.  Increased pain with cough and deep breathing.  He reports some shortness of breath with exertion.  Denies any cough.    His father  is at the bedside.    Review of Systems   Constitutional:  Negative for chills and fever.   Respiratory:  Positive for shortness of breath. Negative for cough.    Cardiovascular:  Positive for chest pain (left lateral). Negative for palpitations.   Musculoskeletal:  Negative for arthralgias and back pain.   Skin:  Negative for color change and rash.   Neurological:  Negative for dizziness and syncope.   All other systems reviewed and are negative.      Historical Information   I have reviewed the patient's PMH, PSH, Social History, Family History, Meds, and Allergies  Historical Information   Past Medical History:   Diagnosis Date    Anxiety     Asthma     Depression      Past Surgical History:   Procedure Laterality Date    CHEST WALL RECONSTRUCTION      WISDOM TOOTH EXTRACTION       Social History     Tobacco Use    Smoking status: Former     Current packs/day: 0.00     Average packs/day: 1 pack/day for 22.8 years (22.8 ttl pk-yrs)     Types: Cigarettes     Start date: 2001     Quit date: 10/2/2023     Years since quittin.1    Smokeless tobacco: Never   Vaping Use    Vaping status: Never Used   Substance and Sexual Activity    Alcohol use: Never    Drug use: Yes     Types: Marijuana, Methamphetamines    Sexual activity: Not on file     E-Cigarette/Vaping    E-Cigarette Use Never User      E-Cigarette/Vaping Substances    Nicotine No     THC No     CBD No     Flavoring No     Other No     Unknown No      Family history non-contributory  Social History     Tobacco Use    Smoking status: Former     Current packs/day: 0.00     Average packs/day: 1 pack/day for 22.8 years (22.8 ttl pk-yrs)     Types: Cigarettes     Start date: 2001     Quit date: 10/2/2023     Years since quittin.1    Smokeless tobacco: Never   Vaping Use    Vaping status: Never Used   Substance and Sexual Activity    Alcohol use: Never    Drug use: Yes     Types: Marijuana, Methamphetamines    Sexual activity: Not on file       Current  Facility-Administered Medications:     acetaminophen (TYLENOL) tablet 650 mg, Q6H PRN    albuterol (PROVENTIL HFA,VENTOLIN HFA) inhaler 2 puff, Q4H PRN    atorvastatin (LIPITOR) tablet 10 mg, Daily    buPROPion (WELLBUTRIN XL) 24 hr tablet 300 mg, Daily    HYDROmorphone (DILAUDID) injection 0.5 mg, Q4H PRN    lidocaine-epinephrine (XYLOCAINE/EPINEPHRINE) 1 %-1:100,000 injection 20 mL, Once    metoprolol tartrate (LOPRESSOR) tablet 25 mg, Q12H NIC    ondansetron (ZOFRAN) injection 4 mg, Q6H PRN    oxyCODONE (ROXICODONE) IR tablet 5 mg, Q4H PRN    QUEtiapine (SEROquel) tablet 100 mg, HS  Patient has no known allergies.      Objective :  Temp:  [97.7 °F (36.5 °C)] 97.7 °F (36.5 °C)  HR:  [] 63  BP: (103-148)/(62-94) 129/72  Resp:  [15-20] 20  SpO2:  [100 %] 100 %  O2 Device: Nasal cannula  Nasal Cannula O2 Flow Rate (L/min):  [2 L/min] 2 L/min    Physical Exam  Vitals and nursing note reviewed.   Constitutional:       General: He is not in acute distress.     Appearance: Normal appearance. He is well-developed.   HENT:      Head: Normocephalic and atraumatic.      Nose: Nose normal.   Eyes:      Conjunctiva/sclera: Conjunctivae normal.   Cardiovascular:      Rate and Rhythm: Normal rate and regular rhythm.      Heart sounds: No murmur heard.  Pulmonary:      Effort: Pulmonary effort is normal. No respiratory distress.      Breath sounds: Normal breath sounds. No wheezing or rhonchi.      Comments: Chest tube in place.  No significant air leaks.  No kinks in tubing  Chest:      Chest wall: Tenderness (left lateral tenderness at CT insertion site) present.   Abdominal:      General: Abdomen is flat.      Palpations: Abdomen is soft.   Musculoskeletal:         General: No swelling. Normal range of motion.      Cervical back: Neck supple.   Skin:     General: Skin is warm and dry.      Capillary Refill: Capillary refill takes less than 2 seconds.   Neurological:      General: No focal deficit present.      Mental  Status: He is alert and oriented to person, place, and time.   Psychiatric:         Mood and Affect: Mood normal.           Lab Results: I have reviewed the following results:  .     11/27/24  0723 11/27/24  0745   WBC 6.40  --    HGB 15.6  --    HCT 45.4  --      --    SODIUM  --  136   K  --  5.1   CL  --  103   CO2  --  24   BUN  --  15   CREATININE  --  0.92   GLUC  --  104   HSTNI0 <2  --      ABG: No new results in last 24 hours.    Imaging Results Review: I reviewed radiology reports from this admission including: chest xray.    Chest portable, 11/27/2024  Interval left apical chest tube placement with resolution of pneumothorax.       XR chest portable, 11/27/2024  Moderate left pneumothorax    Other Study Results Review: No additional pertinent studies reviewed.      Sarita Pino, MSN RN FNP-BC  Nurse Practitioner  [facility]

## 2024-11-28 ENCOUNTER — APPOINTMENT (INPATIENT)
Dept: RADIOLOGY | Facility: HOSPITAL | Age: 39
DRG: 200 | End: 2024-11-28
Payer: COMMERCIAL

## 2024-11-28 PROBLEM — R00.1 BRADYCARDIA: Status: ACTIVE | Noted: 2024-11-28

## 2024-11-28 LAB
ANION GAP SERPL CALCULATED.3IONS-SCNC: 3 MMOL/L (ref 4–13)
BUN SERPL-MCNC: 15 MG/DL (ref 5–25)
CALCIUM SERPL-MCNC: 9 MG/DL (ref 8.4–10.2)
CHLORIDE SERPL-SCNC: 103 MMOL/L (ref 96–108)
CO2 SERPL-SCNC: 30 MMOL/L (ref 21–32)
CREAT SERPL-MCNC: 0.97 MG/DL (ref 0.6–1.3)
ERYTHROCYTE [DISTWIDTH] IN BLOOD BY AUTOMATED COUNT: 11.6 % (ref 11.6–15.1)
GFR SERPL CREATININE-BSD FRML MDRD: 97 ML/MIN/1.73SQ M
GLUCOSE SERPL-MCNC: 85 MG/DL (ref 65–140)
HCT VFR BLD AUTO: 36.1 % (ref 36.5–49.3)
HGB BLD-MCNC: 12.2 G/DL (ref 12–17)
MCH RBC QN AUTO: 31.5 PG (ref 26.8–34.3)
MCHC RBC AUTO-ENTMCNC: 33.8 G/DL (ref 31.4–37.4)
MCV RBC AUTO: 93 FL (ref 82–98)
PLATELET # BLD AUTO: 245 THOUSANDS/UL (ref 149–390)
PMV BLD AUTO: 9.7 FL (ref 8.9–12.7)
POTASSIUM SERPL-SCNC: 4 MMOL/L (ref 3.5–5.3)
RBC # BLD AUTO: 3.87 MILLION/UL (ref 3.88–5.62)
SODIUM SERPL-SCNC: 136 MMOL/L (ref 135–147)
WBC # BLD AUTO: 7.4 THOUSAND/UL (ref 4.31–10.16)

## 2024-11-28 PROCEDURE — 99232 SBSQ HOSP IP/OBS MODERATE 35: CPT | Performed by: PHYSICIAN ASSISTANT

## 2024-11-28 PROCEDURE — 80048 BASIC METABOLIC PNL TOTAL CA: CPT | Performed by: PHYSICIAN ASSISTANT

## 2024-11-28 PROCEDURE — 85027 COMPLETE CBC AUTOMATED: CPT | Performed by: PHYSICIAN ASSISTANT

## 2024-11-28 PROCEDURE — 71045 X-RAY EXAM CHEST 1 VIEW: CPT

## 2024-11-28 PROCEDURE — 99232 SBSQ HOSP IP/OBS MODERATE 35: CPT | Performed by: INTERNAL MEDICINE

## 2024-11-28 RX ADMIN — OXYCODONE HYDROCHLORIDE 5 MG: 5 TABLET ORAL at 15:52

## 2024-11-28 RX ADMIN — QUETIAPINE FUMARATE 100 MG: 100 TABLET ORAL at 21:28

## 2024-11-28 RX ADMIN — OXYCODONE HYDROCHLORIDE 5 MG: 5 TABLET ORAL at 20:17

## 2024-11-28 RX ADMIN — ATORVASTATIN CALCIUM 10 MG: 10 TABLET, FILM COATED ORAL at 15:52

## 2024-11-28 RX ADMIN — OXYCODONE HYDROCHLORIDE 5 MG: 5 TABLET ORAL at 08:43

## 2024-11-28 RX ADMIN — HYDROMORPHONE HYDROCHLORIDE 0.5 MG: 1 INJECTION, SOLUTION INTRAMUSCULAR; INTRAVENOUS; SUBCUTANEOUS at 00:45

## 2024-11-28 RX ADMIN — OXYCODONE HYDROCHLORIDE 5 MG: 5 TABLET ORAL at 04:06

## 2024-11-28 RX ADMIN — BUPROPION HYDROCHLORIDE 300 MG: 300 TABLET, FILM COATED, EXTENDED RELEASE ORAL at 08:43

## 2024-11-28 NOTE — PLAN OF CARE
Problem: PAIN - ADULT  Goal: Verbalizes/displays adequate comfort level or baseline comfort level  Description: Interventions:  - Encourage patient to monitor pain and request assistance  - Assess pain using appropriate pain scale  - Administer analgesics based on type and severity of pain and evaluate response  - Implement non-pharmacological measures as appropriate and evaluate response  - Consider cultural and social influences on pain and pain management  - Notify physician/advanced practitioner if interventions unsuccessful or patient reports new pain  Outcome: Progressing     Problem: INFECTION - ADULT  Goal: Absence or prevention of progression during hospitalization  Description: INTERVENTIONS:  - Assess and monitor for signs and symptoms of infection  - Monitor lab/diagnostic results  - Monitor all insertion sites, i.e. indwelling lines, tubes, and drains  - Monitor endotracheal if appropriate and nasal secretions for changes in amount and color  - [place] appropriate cooling/warming therapies per order  - Administer medications as ordered  - Instruct and encourage patient and family to use good hand hygiene technique  - Identify and instruct in appropriate isolation precautions for identified infection/condition  Outcome: Progressing  Goal: Absence of fever/infection during neutropenic period  Description: INTERVENTIONS:  - Monitor WBC    Outcome: Progressing     Problem: SAFETY ADULT  Goal: Patient will remain free of falls  Description: INTERVENTIONS:  - Educate patient/family on patient safety including physical limitations  - Instruct patient to call for assistance with activity   - Consult OT/PT to assist with strengthening/mobility   - Keep Call bell within reach  - Keep bed low and locked with side rails adjusted as appropriate  - Keep care items and personal belongings within reach  - Initiate and maintain comfort rounds  - Make Fall Risk Sign visible to staff  - Offer Toileting every 2 Hours,  in advance of need  - Initiate/Maintain bed alarm  - Obtain necessary fall risk management equipment  - Apply yellow socks and bracelet for high fall risk patients  - Consider moving patient to room near nurses station  Outcome: Progressing  Goal: Maintain or return to baseline ADL function  Description: INTERVENTIONS:  -  Assess patient's ability to carry out ADLs; assess patient's baseline for ADL function and identify physical deficits which impact ability to perform ADLs (bathing, care of mouth/teeth, toileting, grooming, dressing, etc.)  - Assess/evaluate cause of self-care deficits   - Assess range of motion  - Assess patient's mobility; develop plan if impaired  - Assess patient's need for assistive devices and provide as appropriate  - Encourage maximum independence but intervene and supervise when necessary  - Involve family in performance of ADLs  - Assess for home care needs following discharge   - Consider OT consult to assist with ADL evaluation and planning for discharge  - Provide patient education as appropriate  Outcome: Progressing  Goal: Maintains/Returns to pre admission functional level  Description: INTERVENTIONS:  - Perform AM-PAC 6 Click Basic Mobility/ Daily Activity assessment daily.  - Set and communicate daily mobility goal to care team and patient/family/caregiver.   - Collaborate with rehabilitation services on mobility goals if consulted  - Perform Range of Motion 4 times a day.  - Reposition patient every 2 hours.  - Dangle patient 3 times a day  - Stand patient 3 times a day  - Ambulate patient 3 times a day  - Out of bed to chair 3 times a day   - Out of bed for meals 3 times a day  - Out of bed for toileting  - Record patient progress and toleration of activity level   Outcome: Progressing     Problem: DISCHARGE PLANNING  Goal: Discharge to home or other facility with appropriate resources  Description: INTERVENTIONS:  - Identify barriers to discharge w/patient and caregiver  -  Arrange for needed discharge resources and transportation as appropriate  - Identify discharge learning needs (meds, wound care, etc.)  - Arrange for interpretive services to assist at discharge as needed  - Refer to Case Management Department for coordinating discharge planning if the patient needs post-hospital services based on physician/advanced practitioner order or complex needs related to functional status, cognitive ability, or social support system  Outcome: Progressing     Problem: Knowledge Deficit  Goal: Patient/family/caregiver demonstrates understanding of disease process, treatment plan, medications, and discharge instructions  Description: Complete learning assessment and assess knowledge base.  Interventions:  - Provide teaching at level of understanding  - Provide teaching via preferred learning methods  Outcome: Progressing

## 2024-11-28 NOTE — PROGRESS NOTES
Progress Note - Pulmonology   Name: Juan Antonio Cesar 39 y.o. male I MRN: [id number]  Unit/Bed#: -[facility] I Date of Admission: 11/27/2024   Date of Service: 11/28/2024 I Hospital Day: 1     Assessment & Plan  Spontaneous pneumothorax    CXR with moderate left pneumothorax  ED chest tube placed. Repeat CXR with resolution of pneumothorax  Placed CT on water seal this am. Repeat CXR in 2 hours showed no recurrence of ptx  Plan for CXR in am, if stable will do clamping trial with hopes to remove CT  Tobacco use disorder, mild, in sustained remission  Former smoker, quit 13 months ago. Has intermittently vaped since quitting smoking  Encouraged continued complete smoking cessation  Moderate episode of recurrent major depressive disorder (HCC)  Management per primary team    24 Hour Events : pain at CT site  Subjective : still with pain however medications are helping. No significant dyspnea. No nausea or vomiting.     Objective :  Temp:  [97.2 °F (36.2 °C)-98.2 °F (36.8 °C)] 97.3 °F (36.3 °C)  HR:  [41-58] 54  BP: (101-110)/(55-67) 109/65  Resp:  [22] 22  SpO2:  [99 %-100 %] 100 %  O2 Device: None (Room air)  Nasal Cannula O2 Flow Rate (L/min):  [2 L/min] 2 L/min    Physical Exam    General:  Patient is awake, alert, non-toxic and in no acute respiratory distress  Eyes: no scleral icterus  CV:  Regular, +S1 and S2, No murmurs, gallops or rubs appreciated  Lungs: Clear to auscultation bilateral without wheeze, rales or rhonci  CT - no air leak on -20 suction  Abdomen: Soft, +BS, Non-tender, non-distended  Extremities: No clubbing, cyanosis or edema  Neuro: No focal motor/sensory deficits  Skin: Warm, No rashes or ulcerations      Lab Results: I have reviewed the following results:   .     11/28/24  0400   WBC 7.40   HGB 12.2   HCT 36.1*      SODIUM 136   K 4.0      CO2 30   BUN 15   CREATININE 0.97   GLUC 85     ABG: No new results in last 24 hours.    Imaging Results Review: I personally reviewed the  following image studies in PACS and associated radiology reports: chest xray. My interpretation of the radiology images/reports is: Left CT present, no residual pneumothorax.     24

## 2024-11-28 NOTE — ASSESSMENT & PLAN NOTE
CXR with moderate left pneumothorax  ED chest tube placed. Repeat CXR with resolution of pneumothorax  Placed CT on water seal this am. Repeat CXR in 2 hours showed no recurrence of ptx  Plan for CXR in am, if stable will do clamping trial with hopes to remove CT

## 2024-11-28 NOTE — PLAN OF CARE
Problem: PAIN - ADULT  Goal: Verbalizes/displays adequate comfort level or baseline comfort level  Description: Interventions:  - Encourage patient to monitor pain and request assistance  - Assess pain using appropriate pain scale  - Administer analgesics based on type and severity of pain and evaluate response  - Implement non-pharmacological measures as appropriate and evaluate response  - Consider cultural and social influences on pain and pain management  - Notify physician/advanced practitioner if interventions unsuccessful or patient reports new pain  Outcome: Progressing     Problem: INFECTION - ADULT  Goal: Absence or prevention of progression during hospitalization  Description: INTERVENTIONS:  - Assess and monitor for signs and symptoms of infection  - Monitor lab/diagnostic results  - Monitor all insertion sites, i.e. indwelling lines, tubes, and drains  - Monitor endotracheal if appropriate and nasal secretions for changes in amount and color  - [place] appropriate cooling/warming therapies per order  - Administer medications as ordered  - Instruct and encourage patient and family to use good hand hygiene technique  - Identify and instruct in appropriate isolation precautions for identified infection/condition  Outcome: Progressing  Goal: Absence of fever/infection during neutropenic period  Description: INTERVENTIONS:  - Monitor WBC    Outcome: Progressing     Problem: DISCHARGE PLANNING  Goal: Discharge to home or other facility with appropriate resources  Description: INTERVENTIONS:  - Identify barriers to discharge w/patient and caregiver  - Arrange for needed discharge resources and transportation as appropriate  - Identify discharge learning needs (meds, wound care, etc.)  - Arrange for interpretive services to assist at discharge as needed  - Refer to Case Management Department for coordinating discharge planning if the patient needs post-hospital services based on physician/advanced  practitioner order or complex needs related to functional status, cognitive ability, or social support system  Outcome: Progressing

## 2024-11-28 NOTE — ASSESSMENT & PLAN NOTE
Patient was bradycardic overnight likely secondary to metoprolol administration  Discontinue metoprolol and monitor off

## 2024-11-28 NOTE — ASSESSMENT & PLAN NOTE
Patient presented to the ER after sudden onset of chest pressure, difficulty breathing  Found to have left sided moderate pneumothorax on chest x-ray  Status post chest tube insertion - to water seal today. F/u CXR and if lung is still up will clamp and remove for hopeful dc tomorrow  Pulmonary following

## 2024-11-29 ENCOUNTER — APPOINTMENT (INPATIENT)
Dept: RADIOLOGY | Facility: HOSPITAL | Age: 39
DRG: 200 | End: 2024-11-29
Payer: COMMERCIAL

## 2024-11-29 LAB
ANION GAP SERPL CALCULATED.3IONS-SCNC: 6 MMOL/L (ref 4–13)
BUN SERPL-MCNC: 18 MG/DL (ref 5–25)
CALCIUM SERPL-MCNC: 8.7 MG/DL (ref 8.4–10.2)
CHLORIDE SERPL-SCNC: 103 MMOL/L (ref 96–108)
CO2 SERPL-SCNC: 29 MMOL/L (ref 21–32)
CREAT SERPL-MCNC: 0.83 MG/DL (ref 0.6–1.3)
ERYTHROCYTE [DISTWIDTH] IN BLOOD BY AUTOMATED COUNT: 11.4 % (ref 11.6–15.1)
GFR SERPL CREATININE-BSD FRML MDRD: 110 ML/MIN/1.73SQ M
GLUCOSE SERPL-MCNC: 93 MG/DL (ref 65–140)
HCT VFR BLD AUTO: 37.5 % (ref 36.5–49.3)
HGB BLD-MCNC: 12.7 G/DL (ref 12–17)
MAGNESIUM SERPL-MCNC: 2 MG/DL (ref 1.9–2.7)
MCH RBC QN AUTO: 31.4 PG (ref 26.8–34.3)
MCHC RBC AUTO-ENTMCNC: 33.9 G/DL (ref 31.4–37.4)
MCV RBC AUTO: 93 FL (ref 82–98)
PHOSPHATE SERPL-MCNC: 3.9 MG/DL (ref 2.7–4.5)
PLATELET # BLD AUTO: 235 THOUSANDS/UL (ref 149–390)
PMV BLD AUTO: 9.5 FL (ref 8.9–12.7)
POTASSIUM SERPL-SCNC: 4.2 MMOL/L (ref 3.5–5.3)
RBC # BLD AUTO: 4.05 MILLION/UL (ref 3.88–5.62)
SODIUM SERPL-SCNC: 138 MMOL/L (ref 135–147)
WBC # BLD AUTO: 6.59 THOUSAND/UL (ref 4.31–10.16)

## 2024-11-29 PROCEDURE — 99232 SBSQ HOSP IP/OBS MODERATE 35: CPT | Performed by: INTERNAL MEDICINE

## 2024-11-29 PROCEDURE — 0WPBX0Z REMOVAL OF DRAINAGE DEVICE FROM LEFT PLEURAL CAVITY, EXTERNAL APPROACH: ICD-10-PCS | Performed by: INTERNAL MEDICINE

## 2024-11-29 PROCEDURE — 71045 X-RAY EXAM CHEST 1 VIEW: CPT

## 2024-11-29 PROCEDURE — 99232 SBSQ HOSP IP/OBS MODERATE 35: CPT

## 2024-11-29 PROCEDURE — 83735 ASSAY OF MAGNESIUM: CPT | Performed by: INTERNAL MEDICINE

## 2024-11-29 PROCEDURE — 80048 BASIC METABOLIC PNL TOTAL CA: CPT | Performed by: INTERNAL MEDICINE

## 2024-11-29 PROCEDURE — 85027 COMPLETE CBC AUTOMATED: CPT | Performed by: INTERNAL MEDICINE

## 2024-11-29 PROCEDURE — 84100 ASSAY OF PHOSPHORUS: CPT | Performed by: INTERNAL MEDICINE

## 2024-11-29 RX ORDER — OXYCODONE HYDROCHLORIDE 5 MG/1
5 TABLET ORAL EVERY 6 HOURS PRN
Qty: 28 TABLET | Refills: 0 | Status: SHIPPED | OUTPATIENT
Start: 2024-11-29 | End: 2024-12-06

## 2024-11-29 RX ORDER — SODIUM CHLORIDE, SODIUM GLUCONATE, SODIUM ACETATE, POTASSIUM CHLORIDE, MAGNESIUM CHLORIDE, SODIUM PHOSPHATE, DIBASIC, AND POTASSIUM PHOSPHATE .53; .5; .37; .037; .03; .012; .00082 G/100ML; G/100ML; G/100ML; G/100ML; G/100ML; G/100ML; G/100ML
500 INJECTION, SOLUTION INTRAVENOUS ONCE
Status: COMPLETED | OUTPATIENT
Start: 2024-11-29 | End: 2024-11-29

## 2024-11-29 RX ADMIN — SODIUM CHLORIDE, SODIUM GLUCONATE, SODIUM ACETATE, POTASSIUM CHLORIDE, MAGNESIUM CHLORIDE, SODIUM PHOSPHATE, DIBASIC, AND POTASSIUM PHOSPHATE 500 ML: .53; .5; .37; .037; .03; .012; .00082 INJECTION, SOLUTION INTRAVENOUS at 00:29

## 2024-11-29 RX ADMIN — OXYCODONE HYDROCHLORIDE 5 MG: 5 TABLET ORAL at 04:56

## 2024-11-29 RX ADMIN — ATORVASTATIN CALCIUM 10 MG: 10 TABLET, FILM COATED ORAL at 15:26

## 2024-11-29 RX ADMIN — BUPROPION HYDROCHLORIDE 300 MG: 300 TABLET, FILM COATED, EXTENDED RELEASE ORAL at 07:34

## 2024-11-29 RX ADMIN — HYDROMORPHONE HYDROCHLORIDE 0.5 MG: 1 INJECTION, SOLUTION INTRAMUSCULAR; INTRAVENOUS; SUBCUTANEOUS at 12:40

## 2024-11-29 RX ADMIN — OXYCODONE HYDROCHLORIDE 5 MG: 5 TABLET ORAL at 11:12

## 2024-11-29 RX ADMIN — QUETIAPINE FUMARATE 100 MG: 100 TABLET ORAL at 21:06

## 2024-11-29 NOTE — PROGRESS NOTES
Progress Note - Pulmonology   Name: Juan Antonio Cesar 39 y.o. male I MRN: [id number]  Unit/Bed#: -01 I Date of Admission: 11/27/2024   Date of Service: 11/29/2024 I Hospital Day: 2    Assessment & Plan  Spontaneous pneumothorax    CXR with moderate left pneumothorax  Chest tube placed in ED 11/27  Chest tube clamped this AM, will repeat CXR after 2 hrs if findings stable will d/c chest tube   Tobacco use disorder, mild, in sustained remission  Former smoker, quit 13 months ago. Has intermittently vaped since quitting smoking  Encouraged continued complete smoking cessation  Moderate episode of recurrent major depressive disorder (HCC)  Management per primary team      Subjective : Patient denies dyspnea, continues to have discomfort at chest tube site    Objective :  Temp:  [97.3 °F (36.3 °C)-98.6 °F (37 °C)] 97.3 °F (36.3 °C)  HR:  [51-73] 55  BP: ()/(50-68) 100/62  Resp:  [14-16] 14  SpO2:  [96 %-100 %] 96 %  O2 Device: None (Room air)    Physical Exam    General appearance: Alert and awake, in no acute distress  Head: Normocephalic, without obvious abnormality, atraumatic  Eyes: No scleral icterus   Lungs: CTA b/l. L sided chest tube in place  Heart: Regular rate  Abdomen:  No appreciable distension or tenderness  Extremities: No deformity  Skin: Warm and dry  Neurologic: No acute focal deficits are noted        Lab Results: I have reviewed the following results:   .     11/29/24  0453   WBC 6.59   HGB 12.7   HCT 37.5      SODIUM 138   K 4.2      CO2 29   BUN 18   CREATININE 0.83   GLUC 93   MG 2.0   PHOS 3.9     ABG: No new results in last 24 hours.    Imaging Results Review: I reviewed radiology reports from this admission including: chest xray.  Other Study Results Review: No additional pertinent studies reviewed.  PFT Results Reviewed: n/a

## 2024-11-29 NOTE — QUICK NOTE
Follow-up CXR shows very slight increase in pneumothorax. Discussed with patient, SLIM, nursing, and patient's friend at bedside recommendation to stay overnight for repeat CXR in the morning and O2 therapy to help encourage re-expansion of lung. Patient feeling fine. No need for repeat chest tube at this time. All questions answered.    Dahlia Adler PA-C

## 2024-11-29 NOTE — QUICK NOTE
Procedure:  Chest tube removal    Chest tube was removed in routine fashion without incident.  Dr. Sandoval assisted.  A dry, sterile dressing was placed.    Patient advised to call nursing right away if any shortness of breath or chest pain.     He was instructed that care for his dressing would be in his discharge summary and to call [facility] with any questions or concerns.    CXR will be done at 4 PM, if stable patient may leave this evening.

## 2024-11-29 NOTE — DISCHARGE SUMMARY
Discharge Summary - Hospitalist   Name: Juan Antonio Cesar 39 y.o. male I MRN: [id number]  Unit/Bed#: -01 I Date of Admission: 11/27/2024   Date of Service: 11/30/2024 I Hospital Day: 3     Assessment & Plan  Spontaneous pneumothorax  Patient presented to the ER after sudden onset of chest pressure, difficulty breathing  Found to have left sided moderate pneumothorax on chest x-ray  Status post chest tube insertion - to water seal today.  CXR- trace left apical pneumothorax.   Chest tube removed on 11/29- repeat CXR showed slight increase in apical pneumothorax. Discussed with pulm agreed to monitor over night on supplemental O2. Repeat CXR on 11/30/24 showed stable small left apical pneumothorax  Discussed with pulmonology-  patient is stable for discharge   Moderate episode of recurrent major depressive disorder (HCC)  Previously on lithium but no longer taking  Continue home Seroquel 100 mg nightly for mood and sleep  Continue Wellbutrin drainage milligrams daily  Monitor mood  Tobacco use disorder, mild, in sustained remission  Quit smoking 1 year  Bradycardia  Patient was bradycardic 11/28/24  secondary to metoprolol administration  Has been monitored- HR have been stable   Also secondary to opioid medication- as patient has documented bradycardia following administration of pain medication.   Discontinue metoprolol and monitor off     Medical Problems       Resolved Problems  Date Reviewed: 11/29/2024   None       Discharging Physician / Practitioner: Sindi Cortez PA-C  PCP: Lizabeth Valdez DO  Admission Date:   Admission Orders (From admission, onward)       Ordered        11/27/24 0904  INPATIENT ADMISSION  Once                          Discharge Date: 11/30/24    Consultations During Hospital Stay:  Pulmonology    Procedures Performed:   Chest tube insertion  Chest tube removal     Significant Findings / Test Results:   CXR: moderate left pneumothorax   Repeat imaging: trace  stable left apical pneumothorax     Incidental Findings:   None       Test Results Pending at Discharge (will require follow up):   None      Outpatient Tests Requested:  None     Complications:  none     Reason for Admission: spontaneous pneumothorax     Hospital Course:   Juan Antonio Cesar is a 39 y.o. male patient who originally presented to the hospital on 11/27/2024 due to chest pain and worsening shortness of breath. Work up in the ED revealed left-sided spontaneous pneumothorax. Chest tube was inserted. Repeat CX with resolution of pneumothorax. Pulmonology was consulted during this hospitalization. Patient was placed on water seal yesterday morning with repeat CXR that showed no re-occurrence of pneumothorax. This morning he was evaluated again by pulmonology. The chest tube was clamped and repeat imaging was obtained in two hours which continued to show no recurrence. The decision was then made to remove the chest tube and repeat final imaging 4 hours later to ensure no reoccurrence. There was a slight increase in apical pneumothorax. Decision was made to administer supplemental oxygen and observe patient overnight. Repeat CXR this morning continues to show stable apical pneumothorax. Discussed with pulmonology- patient is stable for discharge. Will follow up with pulmonology as an outpatient.      Patient was advised to not participate in any strenuous activity upon discharge for the next two weeks. He will follow up with outpatient. Also spoke to patient regarding work restrictions as he will not be able to do any heavy lifting. 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 Air travel restrictions also explained to patient upon discharge.     Hospital Course: No notes on file      Please see above list of diagnoses and related plan for additional information.     Condition at Discharge: stable    Discharge Day Visit / Exam:   * Please refer to separate progress note for these  details *    Discussion with Family: Attempted to update  (father) via phone. Unable to contact.    Discharge instructions/Information to patient and family:   See after visit summary for information provided to patient and family.      Provisions for Follow-Up Care:  See after visit summary for information related to follow-up care and any pertinent home health orders.      Mobility at time of Discharge:   Basic Mobility Inpatient Raw Score: 24  JH-HLM Goal: 8: Walk 250 feet or more  JH-HLM Achieved: 7: Walk 25 feet or more  HLM Goal achieved. Continue to encourage appropriate mobility.     Disposition:   Home    Planned Readmission: none     Discharge Medications:  See after visit summary for reconciled discharge medications provided to patient and/or family.      Administrative Statements   Discharge Statement:  I have spent a total time of 31 minutes in caring for this patient on the day of the visit/encounter. .    **Please Note: This note may have been constructed using a voice recognition system**

## 2024-11-29 NOTE — ASSESSMENT & PLAN NOTE
Patient presented to the ER after sudden onset of chest pressure, difficulty breathing  Found to have left sided moderate pneumothorax on chest x-ray  Status post chest tube insertion - to water seal today.  CXR- trace left apical pneumothorax.   Discussed with pulm: will proceed with clamp and recheck CXR in two hours. If stable most likely will proceed with chest tube removal   Pulmonary following

## 2024-11-29 NOTE — PLAN OF CARE
Problem: PAIN - ADULT  Goal: Verbalizes/displays adequate comfort level or baseline comfort level  Description: Interventions:  - Encourage patient to monitor pain and request assistance  - Assess pain using appropriate pain scale  - Administer analgesics based on type and severity of pain and evaluate response  - Implement non-pharmacological measures as appropriate and evaluate response  - Consider cultural and social influences on pain and pain management  - Notify physician/advanced practitioner if interventions unsuccessful or patient reports new pain  Outcome: Progressing     Problem: INFECTION - ADULT  Goal: Absence or prevention of progression during hospitalization  Description: INTERVENTIONS:  - Assess and monitor for signs and symptoms of infection  - Monitor lab/diagnostic results  - Monitor all insertion sites, i.e. indwelling lines, tubes, and drains  - Monitor endotracheal if appropriate and nasal secretions for changes in amount and color  - [place] appropriate cooling/warming therapies per order  - Administer medications as ordered  - Instruct and encourage patient and family to use good hand hygiene technique  - Identify and instruct in appropriate isolation precautions for identified infection/condition  Outcome: Progressing  Goal: Absence of fever/infection during neutropenic period  Description: INTERVENTIONS:  - Monitor WBC    Outcome: Progressing

## 2024-11-29 NOTE — DISCHARGE INSTR - AVS FIRST PAGE
Dear Juan Antonio Cesar,     It was our pleasure to care for you here at [facility].  It is our hope that we were always able to meet and exceed the expected standards for your care during your stay.  You were hospitalized due to spontaneous pneumothorax .  You were cared for on the 2nd floor under the service of Sindi Cortez PA-C with the [facility] who covers for your primary care physician (PCP), Lizabeth Valdez DO, while you were hospitalized.  If you have any questions or concerns related to this hospitalization, you may contact us at .  For follow up, we recommend that you follow up with your primary care physician.  Please review this entire discharge summary as additional general instructions may be provided later as well.  However, at this time we provide for you here, the most important instructions / recommendations at discharge:     Refrain from any strenuous activity which includes ( exercise, heavy lifting, contact sports)   No air travel   Continue with pain management regimen as needed. Transition to tylenol and motrin   Follow up with outpatient pulmonology upon discharge       Sincerely,     Sindi Cortez PA-C

## 2024-11-29 NOTE — ASSESSMENT & PLAN NOTE
CXR with moderate left pneumothorax  Chest tube placed in ED 11/27  Chest tube clamped this AM, will repeat CXR after 2 hrs if findings stable will d/c chest tube

## 2024-11-29 NOTE — ASSESSMENT & PLAN NOTE
Patient presented to the ER after sudden onset of chest pressure, difficulty breathing  Found to have left sided moderate pneumothorax on chest x-ray  Status post chest tube insertion - to water seal today.  CXR- trace left apical pneumothorax.   Chest tube removed on 11/29- repeat CXR showed slight increase in apical pneumothorax. Discussed with pulm agreed to monitor over night on supplemental O2. Repeat CXR on 11/30/24 showed stable small left apical pneumothorax  Discussed with pulmonology-  patient is stable for discharge

## 2024-11-29 NOTE — ASSESSMENT & PLAN NOTE
Patient was bradycardic overnight likely secondary to metoprolol administration  Also secondary to opioid medication- as patient has documented bradycardia following administration of pain medication.   Discontinue metoprolol and monitor off

## 2024-11-29 NOTE — UTILIZATION REVIEW
Initial Clinical Review    Admission: Date/Time/Statement:   Admission Orders (From admission, onward)       Ordered        11/27/24 0904  INPATIENT ADMISSION  Once                          Orders Placed This Encounter   Procedures    INPATIENT ADMISSION     Standing Status:   Standing     Number of Occurrences:   1     Level of Care:   Med Surg [16]     Estimated length of stay:   More than 2 Midnights     Certification:   I certify that inpatient services are medically necessary for this patient for a duration of greater than two midnights. See H&P and MD Progress Notes for additional information about the patient's course of treatment.     ED Arrival Information       Expected   -    Arrival   11/27/2024 07:10    Acuity   Urgent              Means of arrival   Ambulance    Escorted by   [employer]    Service   Hospitalist    Admission type   Emergency              Arrival complaint   chest pain             Chief Complaint   Patient presents with    Medical Problem     Pt to er via ems from work. States he tried meth for the first time last night, didn't sleep so he took aderrall this am. Complaining of chest pain.        Initial Presentation: 39 y.o. male to ED by ems admitted Inpatient d/t Spontaneous pneumothorax. presented to the ER after sudden onset of chest pressure, difficulty breathing.Found to have left sided moderate pneumothorax on chest x-ray.Status post chest tube insertion.-140's. oxygen as needed.consult pulmonology for chest tube management. oxycodone and Dilaudid.    Anticipated Length of Stay/Certification Statement: Patient will be admitted on an inpatient basis with an anticipated length of stay of greater than 2 midnights secondary to spontaneous pneumothorax.     11/27 PULM CONSULT:Primary spontaneous pneumothorax on the left - he had a CT chest in March 2023 that showed cystic airspaces within the right upper lobe. There is likely some component of mild structural lung disease  related to previous smoking history. spontaneous left pneumothorax after straining. Currently having pain at chest tube site.No history of previous pneumothorax.  No recent trauma.  Quit smoking 13 months ago.awake, alert, non-toxic and in no acute respiratory distress.CXR 11/27 - post CT placement, resolution of left pneumothorax with CT in place.As long as patient is doing well in a.m., will place on waterseal.  If lung remains up, will do clamping trial in 24 hours, with hopes for discharge 11/29/2024.Encourage no smoking/vaping    Date: 11/28   Day 2: spontaneous pneumothorax with chest tube in place.reports he feels significantly better, no shortness of breath. Having some pain at chest tube insertion site but otherwise no dyspnea. Was bradycardic overnight due to metoprolol administration, heart rate this morning in the high 50s and improved.Chest tube to water seal today. F/u CXR and if lung is still up will clamp and remove for hopeful dc tomorrow.Pulmonary following. ANION GAP 3. bradycardic overnight likely secondary to metoprolol administration.Discontinue metoprolol and monitor off.Anticipate discharge tomorrow to home.     ED Treatment-Medication Administration from 11/27/2024 0710 to 11/27/2024 1258         Date/Time Order Dose Route Action     11/27/2024 0723 fentanyl citrate (PF) (FOR EMS ONLY) 100 mcg/2 mL injection 100 mcg 0 mcg Does not apply Given to EMS     11/27/2024 0822 Ketamine HCl 57 mg 50 mg Intravenous Given     11/27/2024 0827 Ketamine HCl 50 mg 50 mg Intravenous Given     11/27/2024 0932 HYDROmorphone (DILAUDID) injection 0.5 mg 0.5 mg Intravenous Given     11/27/2024 1009 buPROPion (WELLBUTRIN XL) 24 hr tablet 300 mg 300 mg Oral Given     11/27/2024 1008 metoprolol tartrate (LOPRESSOR) tablet 25 mg 25 mg Oral Given     11/27/2024 1008 oxyCODONE (ROXICODONE) IR tablet 5 mg 5 mg Oral Given            Scheduled Medications:  atorvastatin, 10 mg, Oral, Daily  buPROPion, 300 mg, Oral,  Daily  lidocaine-epinephrine, 20 mL, Infiltration, Once  QUEtiapine, 100 mg, Oral, HS        PRN Meds:  acetaminophen, 650 mg, Oral, Q6H PRN  albuterol, 2 puff, Inhalation, Q4H PRN  HYDROmorphone, 0.5 mg, Intravenous, Q4H PRN 11/27 x 2, 11/28 x 1  ondansetron, 4 mg, Intravenous, Q6H PRN  oxyCODONE, 5 mg, Oral, Q4H PRN 11/27 x 4, 11/28 x 4, 11/29 x 1    Oob  Reg diet  Chest tube  Scd      ED Triage Vitals   Temperature Pulse Respirations Blood Pressure SpO2 Pain Score   11/27/24 0713 11/27/24 0713 11/27/24 0713 11/27/24 0713 11/27/24 0713 11/27/24 0818   97.7 °F (36.5 °C) 93 20 120/79 100 % No Pain     Weight (last 2 days)       Date/Time Weight    11/27/24 1313 57.2 (126)            Vital Signs (last 3 days)       Date/Time Temp Pulse Resp BP MAP (mmHg) SpO2 Calculated FIO2 (%) - Nasal Cannula Nasal Cannula O2 Flow Rate (L/min) O2 Device Patient Position - Orthostatic VS Opal Coma Scale Score Pain    11/29/24 07:25:45 97.3 °F (36.3 °C) 55 -- 100/62 75 96 % -- -- -- -- -- --    11/29/24 0456 -- -- -- -- -- -- -- -- -- -- -- 7    11/29/24 04:55:07 -- 52 -- 99/59 72 100 % -- -- -- -- -- --    11/29/24 01:33:12 -- 51 -- 94/55 68 100 % -- -- -- -- -- --    11/29/24 0019 97.5 °F (36.4 °C) -- 14 88/54 -- -- -- -- -- -- -- --    11/29/24 00:17:37 -- 51 -- 82/50 61 97 % -- -- -- -- -- --    11/28/24 2117 -- -- -- -- -- -- -- -- None (Room air) -- 15 --    11/28/24 2017 -- -- -- -- -- -- -- -- -- -- -- 7    11/28/24 20:14:34 98.1 °F (36.7 °C) 73 16 104/68 80 97 % -- -- -- -- -- --    11/28/24 19:02:38 98.6 °F (37 °C) 64 -- 113/65 81 97 % -- -- -- -- -- --    11/28/24 1552 -- -- -- -- -- -- -- -- -- -- -- 6    11/28/24 15:39:23 97.5 °F (36.4 °C) 52 -- 117/61 80 100 % -- -- -- -- -- --    11/28/24 0843 -- -- -- -- -- -- -- -- -- -- -- 8    11/28/24 0800 -- -- -- -- -- -- -- -- None (Room air) -- -- --    11/28/24 07:55:11 97.3 °F (36.3 °C) 54 -- 109/65 80 100 % -- -- -- -- -- --    11/28/24 0406 -- -- -- -- -- -- -- -- -- --  -- 7 11/28/24 03:56:32 97.2 °F (36.2 °C) 58 -- 109/58 75 100 % -- -- None (Room air) Lying -- --    11/28/24 0111 -- 41 -- -- -- -- -- -- -- -- -- --    11/28/24 0045 -- -- -- -- -- -- -- -- -- -- -- 9 11/27/24 2345 -- -- -- -- -- -- -- -- -- -- -- 7 11/27/24 23:32:27 97.5 °F (36.4 °C) 53 -- 101/55 70 100 % -- -- -- -- -- --    11/27/24 2100 -- -- -- -- -- -- -- -- None (Room air) -- 15 --    11/27/24 19:49:15 -- 49 -- 110/67 81 100 % -- -- -- -- -- --    11/27/24 1945 -- -- -- -- -- -- -- -- -- -- -- 8 11/27/24 1900 -- 48 -- -- -- -- -- -- -- -- -- --    11/27/24 1733 -- -- -- -- -- -- -- -- -- -- -- 8 11/27/24 1450 -- -- -- -- -- -- -- -- -- -- -- 8 11/27/24 14:41:41 98.2 °F (36.8 °C) 58 22 107/62 77 99 % 28 2 L/min Nasal cannula Lying -- --    11/27/24 1330 -- -- -- -- -- -- -- -- -- -- 15 8 11/27/24 1323 -- -- -- -- -- -- -- -- -- -- -- 8 11/27/24 1313 -- -- -- -- -- -- -- -- -- -- -- 7 11/27/24 13:11:40 99 °F (37.2 °C) 54 -- 128/77 94 100 % -- -- -- -- -- --    11/27/24 13:08:49 99 °F (37.2 °C) 55 32 128/77 94 100 % 28 2 L/min Nasal cannula Lying -- --    11/27/24 1030 -- 63 20 129/72 94 100 % -- -- -- -- -- --    11/27/24 1008 -- -- -- 133/79 -- -- -- -- -- -- -- 9    11/27/24 0932 -- -- -- -- -- -- -- -- -- -- -- 8    11/27/24 08:45:15 -- 93 18 141/90 -- 100 % 28 2 L/min Nasal cannula Lying -- No Pain    11/27/24 0842 -- 97 18 141/90 -- 100 % -- -- -- Lying -- --    11/27/24 0836 -- 114 17 148/94 116 100 % -- -- -- -- -- --    11/27/24 08:35:57 -- 113 18 148/94 -- 100 % 28 2 L/min Nasal cannula -- -- --    11/27/24 08:30:26 -- 139 18 -- -- 100 % 28 2 L/min Nasal cannula Lying -- --    11/27/24 0830 -- 142 18 146/92 114 100 % -- -- -- -- -- --    11/27/24 0826 -- 132 20 136/85 104 100 % -- -- -- -- -- --    11/27/24 08:24:07 -- 114 18 136/85 -- 100 % 28 2 L/min Nasal cannula Lying -- --    11/27/24 08:19:59 -- -- -- -- -- -- -- -- Nasal cannula -- -- --    11/27/24 08:18:43 -- 71 15  103/62 -- 100 % 28 2 L/min Nasal cannula Lying -- No Pain    11/27/24 08:17:10 -- 71 15 103/62 -- 100 % -- -- -- -- -- --    11/27/24 0727 -- -- -- -- -- -- -- -- -- -- 15 --    11/27/24 0713 97.7 °F (36.5 °C) 93 20 120/79 -- 100 % -- -- None (Room air) -- -- --              Pertinent Labs/Diagnostic Test Results:   Radiology:  XR chest portable   Final Interpretation by Ruben Mallory DO (11/29 0917)      Trace left apical pneumothorax. Left-sided chest tube is in stable position.      The study was marked in EPIC for immediate notification.      Workstation performed: [id number]         XR chest portable   Final Interpretation by Mariusz Davenport MD (11/28 1145)      No acute cardiopulmonary disease. Left chest tube in place without residual pneumothorax.            Workstation performed: [id number]         XR chest 1 view portable   Final Interpretation by Ruben Mendiola MD (11/27 1008)      Interval left apical chest tube placement with resolution of pneumothorax.            Resident: Rajan Sanderson I, the attending radiologist, have reviewed the images and agree with the final report above.      Workstation performed: [id number]         X-ray chest 1 view portable   Final Interpretation by Lyndsay Kelly MD (11/27 0728)      Moderate left pneumothorax.      I personally discussed this study with Dr. BENI VENTURA on 11/27/2024 7:28 AM.                  Workstation performed: [id number]         XR chest portable    (Results Pending)     Cardiology:  ECG 12 lead   Final Result by Terrell Esqueda MD (11/27 5270)   Normal sinus rhythm   Rightward axis   Borderline ECG   When compared with ECG of 20-Mar-2023 09:42,   Nonspecific T wave abnormality now evident in Anterior leads   QT has lengthened   Confirmed by Terrell Esqueda ([id number]) on 11/27/2024 1:53:59 PM                  Results from last 7 days   Lab Units 11/29/24  0453 11/28/24  0400 11/27/24  0723   WBC Thousand/uL 6.59 7.40 6.40   HEMOGLOBIN g/dL  12.7 12.2 15.6   HEMATOCRIT % 37.5 36.1* 45.4   PLATELETS Thousands/uL 235 245 331   TOTAL NEUT ABS Thousands/µL  --   --  2.77         Results from last 7 days   Lab Units 11/29/24  0453 11/28/24  0400 11/27/24  0745   SODIUM mmol/L 138 136 136   POTASSIUM mmol/L 4.2 4.0 5.1   CHLORIDE mmol/L 103 103 103   CO2 mmol/L 29 30 24   ANION GAP mmol/L 6 3* 9   BUN mg/dL 18 15 15   CREATININE mg/dL 0.83 0.97 0.92   EGFR ml/min/1.73sq m 110 97 104   CALCIUM mg/dL 8.7 9.0 9.7   MAGNESIUM mg/dL 2.0  --   --    PHOSPHORUS mg/dL 3.9  --   --              Results from last 7 days   Lab Units 11/29/24  0453 11/28/24  0400 11/27/24  0745   GLUCOSE RANDOM mg/dL 93 85 104       Results from last 7 days   Lab Units 11/27/24  1114 11/27/24  0723   HS TNI 0HR ng/L  --  <2   HS TNI 2HR ng/L 3  --    HSTNI D2 ng/L >1  --        Past Medical History:   Diagnosis Date    Anxiety     Asthma     Depression      Present on Admission:   Moderate episode of recurrent major depressive disorder (HCC)   Tobacco use disorder, mild, in sustained remission   Spontaneous pneumothorax      Admitting Diagnosis: Pneumothorax [J93.9]  Known medical problems [Z78.9]  Age/Sex: 39 y.o. male    Network Utilization Review Department  ATTENTION: Please call with any questions or concerns to [phone] and carefully listen to the prompts so that you are directed to the right person. All voicemails are confidential.   For Discharge needs, contact Care Management DC Support Team at [phone] opt. [phone]  Send all requests for admission clinical reviews, approved or denied determinations and any other requests to dedicated fax number below belonging to the campus where the patient is receiving treatment. List of dedicated fax numbers for the Facilities:  FACILITY NAME UR FAX NUMBER   ADMISSION DENIALS (Administrative/Medical Necessity) [phone]   DISCHARGE SUPPORT TEAM (NETWORK) [phone]   PARENT CHILD HEALTH (Maternity/NICU/Pediatrics) [phone]   [facility]  [facility] [phone]   [facility] [phone]   [facility] [phone]   [facility] [phone]   Pending sale to [facility] [phone]   [facility] [phone]   [facility] [phone]   [facility] [phone]   [facility] [phone]   [facility] [phone]   [facility] [phone]   [facility] [phone]

## 2024-11-29 NOTE — PLAN OF CARE
Problem: PAIN - ADULT  Goal: Verbalizes/displays adequate comfort level or baseline comfort level  Description: Interventions:  - Encourage patient to monitor pain and request assistance  - Assess pain using appropriate pain scale  - Administer analgesics based on type and severity of pain and evaluate response  - Implement non-pharmacological measures as appropriate and evaluate response  - Consider cultural and social influences on pain and pain management  - Notify physician/advanced practitioner if interventions unsuccessful or patient reports new pain  Outcome: Progressing     Problem: INFECTION - ADULT  Goal: Absence or prevention of progression during hospitalization  Description: INTERVENTIONS:  - Assess and monitor for signs and symptoms of infection  - Monitor lab/diagnostic results  - Monitor all insertion sites, i.e. indwelling lines, tubes, and drains  - Monitor endotracheal if appropriate and nasal secretions for changes in amount and color  - [place] appropriate cooling/warming therapies per order  - Administer medications as ordered  - Instruct and encourage patient and family to use good hand hygiene technique  - Identify and instruct in appropriate isolation precautions for identified infection/condition  Outcome: Progressing  Goal: Absence of fever/infection during neutropenic period  Description: INTERVENTIONS:  - Monitor WBC    Outcome: Progressing     Problem: SAFETY ADULT  Goal: Patient will remain free of falls  Description: INTERVENTIONS:  - Educate patient/family on patient safety including physical limitations  - Instruct patient to call for assistance with activity   - Consult OT/PT to assist with strengthening/mobility   - Keep Call bell within reach  - Keep bed low and locked with side rails adjusted as appropriate  - Keep care items and personal belongings within reach  - Initiate and maintain comfort rounds  - Make Fall Risk Sign visible to staff  - Offer Toileting every 2 Hours,  in advance of need  - Initiate/Maintain alarm  - Obtain necessary fall risk management equipment  - Apply yellow socks and bracelet for high fall risk patients  - Consider moving patient to room near nurses station  Outcome: Progressing  Goal: Maintain or return to baseline ADL function  Description: INTERVENTIONS:  -  Assess patient's ability to carry out ADLs; assess patient's baseline for ADL function and identify physical deficits which impact ability to perform ADLs (bathing, care of mouth/teeth, toileting, grooming, dressing, etc.)  - Assess/evaluate cause of self-care deficits   - Assess range of motion  - Assess patient's mobility; develop plan if impaired  - Assess patient's need for assistive devices and provide as appropriate  - Encourage maximum independence but intervene and supervise when necessary  - Involve family in performance of ADLs  - Assess for home care needs following discharge   - Consider OT consult to assist with ADL evaluation and planning for discharge  - Provide patient education as appropriate  Outcome: Progressing  Goal: Maintains/Returns to pre admission functional level  Description: INTERVENTIONS:  - Perform AM-PAC 6 Click Basic Mobility/ Daily Activity assessment daily.  - Set and communicate daily mobility goal to care team and patient/family/caregiver.   - Collaborate with rehabilitation services on mobility goals if consulted  - Perform Range of Motion 4 times a day.  - Reposition patient every 2 hours.  - Dangle patient 3 times a day  - Stand patient 3 times a day  - Ambulate patient 3 times a day  - Out of bed to chair 3 times a day   - Out of bed for meals 3 times a day  - Out of bed for toileting  - Record patient progress and toleration of activity level   Outcome: Progressing     Problem: DISCHARGE PLANNING  Goal: Discharge to home or other facility with appropriate resources  Description: INTERVENTIONS:  - Identify barriers to discharge w/patient and caregiver  - Arrange  for needed discharge resources and transportation as appropriate  - Identify discharge learning needs (meds, wound care, etc.)  - Arrange for interpretive services to assist at discharge as needed  - Refer to Case Management Department for coordinating discharge planning if the patient needs post-hospital services based on physician/advanced practitioner order or complex needs related to functional status, cognitive ability, or social support system  Outcome: Progressing     Problem: Knowledge Deficit  Goal: Patient/family/caregiver demonstrates understanding of disease process, treatment plan, medications, and discharge instructions  Description: Complete learning assessment and assess knowledge base.  Interventions:  - Provide teaching at level of understanding  - Provide teaching via preferred learning methods  Outcome: Progressing     Problem: Nutrition/Hydration-ADULT  Goal: Nutrient/Hydration intake appropriate for improving, restoring or maintaining nutritional needs  Description: Monitor and assess patient's nutrition/hydration status for malnutrition. Collaborate with interdisciplinary team and initiate plan and interventions as ordered.  Monitor patient's weight and dietary intake as ordered or per policy. Utilize nutrition screening tool and intervene as necessary. Determine patient's food preferences and provide high-protein, high-caloric foods as appropriate.     INTERVENTIONS:  - Monitor oral intake, urinary output, labs, and treatment plans  - Assess nutrition and hydration status and recommend course of action  - Evaluate amount of meals eaten  - Assist patient with eating if necessary   - Allow adequate time for meals  - Recommend/ encourage appropriate diets, oral nutritional supplements, and vitamin/mineral supplements  - Order, calculate, and assess calorie counts as needed  - Recommend, monitor, and adjust tube feedings and TPN/PPN based on assessed needs  - Assess need for intravenous fluids  -  Provide specific nutrition/hydration education as appropriate  - Include patient/family/caregiver in decisions related to nutrition  Outcome: Progressing

## 2024-11-29 NOTE — PROGRESS NOTES
Progress Note - Hospitalist   Name: Juan Antonio Cesar 39 y.o. male I MRN: [id number]  Unit/Bed#: -01 I Date of Admission: 11/27/2024   Date of Service: 11/29/2024 I Hospital Day: 2    Assessment & Plan  Spontaneous pneumothorax  Patient presented to the ER after sudden onset of chest pressure, difficulty breathing  Found to have left sided moderate pneumothorax on chest x-ray  Status post chest tube insertion - to water seal today.  CXR- trace left apical pneumothorax.   Discussed with pulm: will proceed with clamp and recheck CXR in two hours. If stable most likely will proceed with chest tube removal   Pulmonary following     Moderate episode of recurrent major depressive disorder (HCC)  Previously on lithium but no longer taking  Continue home Seroquel 100 mg nightly for mood and sleep  Continue Wellbutrin drainage milligrams daily  Monitor mood  Tobacco use disorder, mild, in sustained remission  Quit smoking 1 year  Bradycardia  Patient was bradycardic overnight likely secondary to metoprolol administration  Also secondary to opioid medication- as patient has documented bradycardia following administration of pain medication.   Discontinue metoprolol and monitor off    VTE Pharmacologic Prophylaxis: VTE Score: 1 Low Risk (Score 0-2) - Encourage Ambulation.    Mobility:   Basic Mobility Inpatient Raw Score: 24  JH-HLM Goal: 8: Walk 250 feet or more  JH-HLM Achieved: 6: Walk 10 steps or more  JH-HLM Goal achieved. Continue to encourage appropriate mobility.    Patient Centered Rounds: I performed bedside rounds with nursing staff today.   Discussions with Specialists or Other Care Team Provider: Pulmonology     Education and Discussions with Family / Patient: Attempted to update  (father) via phone. Unable to contact.    Current Length of Stay: 2 day(s)  Current Patient Status: Inpatient   Certification Statement: The patient will continue to require additional inpatient hospital stay due to  clamping of Chest Tube and removal   Discharge Plan: Anticipate discharge later today or tomorrow to home.    Code Status: Level 1 - Full Code    Subjective   Patient seen and examined at bedside. He is endorsing mild pain around chest tube insertion sight. Denies any shortness of breath or chest pain.     Objective :  Temp:  [97.3 °F (36.3 °C)-98.6 °F (37 °C)] 97.3 °F (36.3 °C)  HR:  [51-73] 55  BP: ()/(50-68) 100/62  Resp:  [14-16] 14  SpO2:  [96 %-100 %] 96 %  O2 Device: None (Room air)    Body mass index is 18.08 kg/m².     Input and Output Summary (last 24 hours):     Intake/Output Summary (Last 24 hours) at 11/29/2024 1022  Last data filed at 11/29/2024 0859  Gross per 24 hour   Intake 582 ml   Output 538 ml   Net 44 ml       Physical Exam  Vitals reviewed.   Constitutional:       General: He is not in acute distress.     Appearance: He is not diaphoretic.   Cardiovascular:      Rate and Rhythm: Normal rate and regular rhythm.      Heart sounds: No murmur heard.  Pulmonary:      Effort: Pulmonary effort is normal.   Abdominal:      General: There is no distension.      Palpations: Abdomen is soft.   Neurological:      Mental Status: He is alert and oriented to person, place, and time.           Lines/Drains:  Lines/Drains/Airways       Active Status       Name Placement date Placement time Site Days    Chest Tube Left 11/27/24  0800  --  2                            Lab Results: I have reviewed the following results:   Results from last 7 days   Lab Units 11/29/24  0453 11/28/24  0400 11/27/24  0723   WBC Thousand/uL 6.59   < > 6.40   HEMOGLOBIN g/dL 12.7   < > 15.6   HEMATOCRIT % 37.5   < > 45.4   PLATELETS Thousands/uL 235   < > 331   SEGS PCT %  --   --  43   LYMPHO PCT %  --   --  38   MONO PCT %  --   --  12   EOS PCT %  --   --  6    < > = values in this interval not displayed.     Results from last 7 days   Lab Units 11/29/24  0453   SODIUM mmol/L 138   POTASSIUM mmol/L 4.2   CHLORIDE mmol/L 103    CO2 mmol/L 29   BUN mg/dL 18   CREATININE mg/dL 0.83   ANION GAP mmol/L 6   CALCIUM mg/dL 8.7   GLUCOSE RANDOM mg/dL 93                       Recent Cultures (last 7 days):         Imaging Results Review: I reviewed radiology reports from this admission including: chest xray.  Other Study Results Review: No additional pertinent studies reviewed.    Last 24 Hours Medication List:     Current Facility-Administered Medications:     acetaminophen (TYLENOL) tablet 650 mg, Q6H PRN    albuterol (PROVENTIL HFA,VENTOLIN HFA) inhaler 2 puff, Q4H PRN    atorvastatin (LIPITOR) tablet 10 mg, Daily    buPROPion (WELLBUTRIN XL) 24 hr tablet 300 mg, Daily    HYDROmorphone (DILAUDID) injection 0.5 mg, Q4H PRN    lidocaine-epinephrine (XYLOCAINE/EPINEPHRINE) 1 %-1:100,000 injection 20 mL, Once    ondansetron (ZOFRAN) injection 4 mg, Q6H PRN    oxyCODONE (ROXICODONE) IR tablet 5 mg, Q4H PRN    QUEtiapine (SEROquel) tablet 100 mg, HS    Administrative Statements   Today, Patient Was Seen By: Sindi Cortez PA-C      **Please Note: This note may have been constructed using a voice recognition system.**

## 2024-11-29 NOTE — ASSESSMENT & PLAN NOTE
Patient was bradycardic 11/28/24  secondary to metoprolol administration  Has been monitored- HR have been stable   Also secondary to opioid medication- as patient has documented bradycardia following administration of pain medication.   Discontinue metoprolol and monitor off

## 2024-11-29 NOTE — QUICK NOTE
Notified of hypotension around midnight.  Manual BP showed MAP of 65.  Patient asymptomatic.  He did receive Seroquel and oxycodone earlier in the evening.  BP responsive to small bolus of 500 cc.  Suspect borderline low BP in setting of Seroquel and oxycodone.

## 2024-11-30 ENCOUNTER — APPOINTMENT (INPATIENT)
Dept: RADIOLOGY | Facility: HOSPITAL | Age: 39
DRG: 200 | End: 2024-11-30
Payer: COMMERCIAL

## 2024-11-30 VITALS
SYSTOLIC BLOOD PRESSURE: 112 MMHG | DIASTOLIC BLOOD PRESSURE: 64 MMHG | HEART RATE: 60 BPM | OXYGEN SATURATION: 96 % | TEMPERATURE: 98.1 F | RESPIRATION RATE: 20 BRPM | BODY MASS INDEX: 18.04 KG/M2 | HEIGHT: 70 IN | WEIGHT: 126 LBS

## 2024-11-30 DIAGNOSIS — J93.83 SPONTANEOUS PNEUMOTHORAX: Primary | ICD-10-CM

## 2024-11-30 PROCEDURE — 99232 SBSQ HOSP IP/OBS MODERATE 35: CPT | Performed by: INTERNAL MEDICINE

## 2024-11-30 PROCEDURE — 99239 HOSP IP/OBS DSCHRG MGMT >30: CPT

## 2024-11-30 PROCEDURE — 71045 X-RAY EXAM CHEST 1 VIEW: CPT

## 2024-11-30 RX ADMIN — BUPROPION HYDROCHLORIDE 300 MG: 300 TABLET, FILM COATED, EXTENDED RELEASE ORAL at 09:08

## 2024-11-30 NOTE — ASSESSMENT & PLAN NOTE
CXR on arrival with moderate left pneumothorax  Chest tube placed in ED 11/27, removed 11/29  CXR this AM with stable small apical pneumothorax  Patient is asymptomatic  OK for d/c from pulmonary perspective, counseled about signs/symptoms of pneumothorax recurrence patient aware to seek emergent medical attention if he has shortness of breath and/or chest pain  Patient advised he will need CXR in 1 week. Order placed.  Outpatient pulmonary follow-up.

## 2024-11-30 NOTE — PLAN OF CARE
Problem: PAIN - ADULT  Goal: Verbalizes/displays adequate comfort level or baseline comfort level  Description: Interventions:  - Encourage patient to monitor pain and request assistance  - Assess pain using appropriate pain scale  - Administer analgesics based on type and severity of pain and evaluate response  - Implement non-pharmacological measures as appropriate and evaluate response  - Consider cultural and social influences on pain and pain management  - Notify physician/advanced practitioner if interventions unsuccessful or patient reports new pain  Outcome: Progressing     Problem: INFECTION - ADULT  Goal: Absence or prevention of progression during hospitalization  Description: INTERVENTIONS:  - Assess and monitor for signs and symptoms of infection  - Monitor lab/diagnostic results  - Monitor all insertion sites, i.e. indwelling lines, tubes, and drains  - Monitor endotracheal if appropriate and nasal secretions for changes in amount and color  - [place] appropriate cooling/warming therapies per order  - Administer medications as ordered  - Instruct and encourage patient and family to use good hand hygiene technique  - Identify and instruct in appropriate isolation precautions for identified infection/condition  Outcome: Progressing  Goal: Absence of fever/infection during neutropenic period  Description: INTERVENTIONS:  - Monitor WBC    Outcome: Progressing     Problem: DISCHARGE PLANNING  Goal: Discharge to home or other facility with appropriate resources  Description: INTERVENTIONS:  - Identify barriers to discharge w/patient and caregiver  - Arrange for needed discharge resources and transportation as appropriate  - Identify discharge learning needs (meds, wound care, etc.)  - Arrange for interpretive services to assist at discharge as needed  - Refer to Case Management Department for coordinating discharge planning if the patient needs post-hospital services based on physician/advanced  practitioner order or complex needs related to functional status, cognitive ability, or social support system  Outcome: Progressing     Problem: Knowledge Deficit  Goal: Patient/family/caregiver demonstrates understanding of disease process, treatment plan, medications, and discharge instructions  Description: Complete learning assessment and assess knowledge base.  Interventions:  - Provide teaching at level of understanding  - Provide teaching via preferred learning methods  Outcome: Progressing     Problem: Nutrition/Hydration-ADULT  Goal: Nutrient/Hydration intake appropriate for improving, restoring or maintaining nutritional needs  Description: Monitor and assess patient's nutrition/hydration status for malnutrition. Collaborate with interdisciplinary team and initiate plan and interventions as ordered.  Monitor patient's weight and dietary intake as ordered or per policy. Utilize nutrition screening tool and intervene as necessary. Determine patient's food preferences and provide high-protein, high-caloric foods as appropriate.     INTERVENTIONS:  - Monitor oral intake, urinary output, labs, and treatment plans  - Assess nutrition and hydration status and recommend course of action  - Evaluate amount of meals eaten  - Assist patient with eating if necessary   - Allow adequate time for meals  - Recommend/ encourage appropriate diets, oral nutritional supplements, and vitamin/mineral supplements  - Order, calculate, and assess calorie counts as needed  - Recommend, monitor, and adjust tube feedings and TPN/PPN based on assessed needs  - Assess need for intravenous fluids  - Provide specific nutrition/hydration education as appropriate  - Include patient/family/caregiver in decisions related to nutrition  Outcome: Progressing

## 2024-11-30 NOTE — PROGRESS NOTES
Progress Note - Pulmonology   Name: Juan Antonio Cesar 39 y.o. male I MRN: [id number]  Unit/Bed#: -01 I Date of Admission: 11/27/2024   Date of Service: 11/30/2024 I Hospital Day: 3    Assessment & Plan  Spontaneous pneumothorax    CXR on arrival with moderate left pneumothorax  Chest tube placed in ED 11/27, removed 11/29  CXR this AM with stable small apical pneumothorax  Patient is asymptomatic  OK for d/c from pulmonary perspective, counseled about signs/symptoms of pneumothorax recurrence patient aware to seek emergent medical attention if he has shortness of breath and/or chest pain  Patient advised he will need CXR in 1 week. Order placed.  Outpatient pulmonary follow-up.  Tobacco use disorder, mild, in sustained remission  Former smoker, quit 13 months ago. Has intermittently vaped since quitting smoking  Encouraged continued complete smoking cessation  Moderate episode of recurrent major depressive disorder (HCC)  Management per primary team    Discussed with SLIM    24 Hour Events : chest tube removal  Subjective : Patient denies shortness of breath or chest pain. No complaints.    Objective :  Temp:  [97.5 °F (36.4 °C)-98.1 °F (36.7 °C)] 98.1 °F (36.7 °C)  HR:  [59-66] 60  BP: ()/(63-65) 112/64  Resp:  [17-20] 20  SpO2:  [96 %-100 %] 96 %  O2 Device: None (Room air)    Physical Exam    General appearance: Alert and awake, in no acute distress  Head: Normocephalic, without obvious abnormality, atraumatic  Eyes: No scleral icterus   Lungs: CTA b/l  Heart: Regular rate  Abdomen:  No appreciable distension or tenderness  Extremities: No deformity  Skin: Warm and dry  Neurologic: No acute focal deficits are noted        Lab Results: I have reviewed the following results:   No new results in last 24 hours.  ABG: No new results in last 24 hours.    Imaging Results Review: I reviewed radiology reports from this admission including: chest xray.  Other Study Results Review: No additional pertinent studies  reviewed.  PFT Results Reviewed: n/a

## 2024-11-30 NOTE — PLAN OF CARE
Problem: PAIN - ADULT  Goal: Verbalizes/displays adequate comfort level or baseline comfort level  Description: Interventions:  - Encourage patient to monitor pain and request assistance  - Assess pain using appropriate pain scale  - Administer analgesics based on type and severity of pain and evaluate response  - Implement non-pharmacological measures as appropriate and evaluate response  - Consider cultural and social influences on pain and pain management  - Notify physician/advanced practitioner if interventions unsuccessful or patient reports new pain  Outcome: Progressing     Problem: INFECTION - ADULT  Goal: Absence or prevention of progression during hospitalization  Description: INTERVENTIONS:  - Assess and monitor for signs and symptoms of infection  - Monitor lab/diagnostic results  - Monitor all insertion sites, i.e. indwelling lines, tubes, and drains  - Monitor endotracheal if appropriate and nasal secretions for changes in amount and color  - [place] appropriate cooling/warming therapies per order  - Administer medications as ordered  - Instruct and encourage patient and family to use good hand hygiene technique  - Identify and instruct in appropriate isolation precautions for identified infection/condition  Outcome: Progressing     Problem: Nutrition/Hydration-ADULT  Goal: Nutrient/Hydration intake appropriate for improving, restoring or maintaining nutritional needs  Description: Monitor and assess patient's nutrition/hydration status for malnutrition. Collaborate with interdisciplinary team and initiate plan and interventions as ordered.  Monitor patient's weight and dietary intake as ordered or per policy. Utilize nutrition screening tool and intervene as necessary. Determine patient's food preferences and provide high-protein, high-caloric foods as appropriate.     INTERVENTIONS:  - Monitor oral intake, urinary output, labs, and treatment plans  - Assess nutrition and hydration status and  recommend course of action  - Evaluate amount of meals eaten  - Assist patient with eating if necessary   - Allow adequate time for meals  - Recommend/ encourage appropriate diets, oral nutritional supplements, and vitamin/mineral supplements  - Order, calculate, and assess calorie counts as needed  - Recommend, monitor, and adjust tube feedings and TPN/PPN based on assessed needs  - Assess need for intravenous fluids  - Provide specific nutrition/hydration education as appropriate  - Include patient/family/caregiver in decisions related to nutrition  Outcome: Progressing

## 2024-12-02 NOTE — UTILIZATION REVIEW
NOTIFICATION OF ADMISSION DISCHARGE   This is a Notification of Discharge from [facility]. Please be advised that this patient has been discharge from our facility. Below you will find the admission and discharge date and time including the patient’s disposition.   UTILIZATION REVIEW CONTACT:  Jyoti Kenyon  Utilization   Network Utilization Review Department  Phone: [phone] x carefully listen to the prompts. All voicemails are confidential.  Email: [email or web address]     ADMISSION INFORMATION  PRESENTATION DATE: 11/27/2024  7:11 AM  OBERVATION ADMISSION DATE: N/A  INPATIENT ADMISSION DATE: 11/27/24  9:04 AM   DISCHARGE DATE: 11/30/2024 11:07 AM   DISPOSITION:Home/Self Care    Network Utilization Review Department  ATTENTION: Please call with any questions or concerns to [phone] and carefully listen to the prompts so that you are directed to the right person. All voicemails are confidential.   For Discharge needs, contact Care Management DC Support Team at [phone] opt. 2  Send all requests for admission clinical reviews, approved or denied determinations and any other requests to dedicated fax number below belonging to the campus where the patient is receiving treatment. List of dedicated fax numbers for the Facilities:  FACILITY NAME UR FAX NUMBER   ADMISSION DENIALS (Administrative/Medical Necessity) [phone]   DISCHARGE SUPPORT TEAM ([facility]) [phone]   PARENT CHILD HEALTH (Maternity/NICU/Pediatrics) [phone]   [facility] [phone]   [facility] [phone]   [facility] [phone]   [facility] [phone]   Replaced by [facility] [phone]   [facility] [phone]   [facility] [phone]   [facility]  [phone]   [facility] [phone]   [facility] [phone]   [facility] [phone]   [facility] [phone]

## 2024-12-13 ENCOUNTER — OFFICE VISIT (OUTPATIENT)
Age: 39
End: 2024-12-13
Payer: COMMERCIAL

## 2024-12-13 ENCOUNTER — HOSPITAL ENCOUNTER (OUTPATIENT)
Dept: RADIOLOGY | Facility: HOSPITAL | Age: 39
End: 2024-12-13
Payer: COMMERCIAL

## 2024-12-13 VITALS
HEART RATE: 55 BPM | HEIGHT: 70 IN | OXYGEN SATURATION: 99 % | WEIGHT: 126.4 LBS | TEMPERATURE: 97.1 F | DIASTOLIC BLOOD PRESSURE: 62 MMHG | SYSTOLIC BLOOD PRESSURE: 112 MMHG | BODY MASS INDEX: 18.09 KG/M2

## 2024-12-13 DIAGNOSIS — J93.83 SPONTANEOUS PNEUMOTHORAX: Primary | ICD-10-CM

## 2024-12-13 DIAGNOSIS — J93.83 SPONTANEOUS PNEUMOTHORAX: ICD-10-CM

## 2024-12-13 DIAGNOSIS — J93.9 PNEUMOTHORAX: ICD-10-CM

## 2024-12-13 DIAGNOSIS — F17.201 TOBACCO USE DISORDER, MILD, IN SUSTAINED REMISSION: ICD-10-CM

## 2024-12-13 DIAGNOSIS — G47.30 SLEEP APNEA IN ADULT: ICD-10-CM

## 2024-12-13 PROCEDURE — 71046 X-RAY EXAM CHEST 2 VIEWS: CPT

## 2024-12-13 PROCEDURE — 99214 OFFICE O/P EST MOD 30 MIN: CPT | Performed by: NURSE PRACTITIONER

## 2024-12-13 NOTE — ASSESSMENT & PLAN NOTE
Approx 22-23 pack year history and no longer smoking cigarettes.    I have asked him to avoid inhaling any type of nicotine or combustible substances - whether smoking or vaping - that could lead to lung injury.

## 2024-12-13 NOTE — PROGRESS NOTES
Pulmonary Follow-Up Note   Juan Antonio Cesar 39 y.o. male MRN: [id number]  12/13/2024      Assessment/Plan:    Problem List Items Addressed This Visit       Sleep apnea in adult    He has had a prior diagnosis of sleep apnea. He was not able to tolerate CPAP - mask issues and sensory intolerance.  We discussed a dental mandibular advancement device and he is interested.  I have asked him to supply the results of his prior sleep study.         Tobacco use disorder, mild, in sustained remission    Approx 22-23 pack year history and no longer smoking cigarettes.    I have asked him to avoid inhaling any type of nicotine or combustible substances - whether smoking or vaping - that could lead to lung injury.         Spontaneous pneumothorax - Primary    No further respiratory symptoms and on exam his chest is clear.  I recommend he have the repeat CXR to ensure clearing of the pneumothorax. It was ordered at a prior visit and he is asked to go today for that image. He will be contacted with results.    We did review warning signs: shortness of breath, cough, pressure in the chest, elevated HR, low oxygen. If any of these rapidly develop he will go to the ER.    I did review with Frank that smoking or vaping can increase the risk for pneumothorax to recur. Rapid atmospheric pressure changes can also increase the risk - scuba diving, flying, high altitude. These are controllable risk factors.          Other Visit Diagnoses         Pneumothorax              Vaccines: flu eligible    No follow-ups on file.    All of Frank's questions were answered prior to leaving the office today.  He is aware to call our office with any further questions or concerns.    History of Present Illness   Reason for Visit: HFU  Chief Complaint: none  HPI: Juan Antonio Cesar is a 39 y.o. male who presents to the office today following recent hospital stay 11/27/24-11/30/24. He presented to the ER with sudden onset chest pressure and difficulty  "breathing; CXR revealed pneumothorax. He was given chest tube to water seal. Repeat CXR demonstrated improvement and chest tube was discontinued prior to discharge. Retrospectively he did admit to straining just prior to pneumothorax symptoms. He has no prior history of spontaneous pneumo.    He has PMHx tobacco abuse, MICHELLE with CPAP intolerance, depression.    Smoking history: 22 pack years; nonsmoker since 2023. Occasional vaping, nicotine vape.    He has been told he has \"a touch of emphysema\" and does keep albuterol on hand - uses once a month or less. He does exercise on treadmill. No report of recurrent chest infection or bronchitis. Denies wheezing or coughing. Feels he is sleeping well.      Review of Systems  Please note that a 14-point review of systems was performed to include Constitutional, HEENT, Respiratory, CVS, GI, , Musculoskeletal, Integumentary, Neurologic, Rheumatologic, Endocrinologic, Psychiatric, Lymphatic, and Hematologic/Oncologic systems were reviewed and are negative unless otherwise stated in HPI. Positive and negative findings pertinent to this evaluation are incorporated into the history of present illness.       Historical Information   Past Medical History:   Diagnosis Date    Anxiety     Asthma     Depression      Past Surgical History:   Procedure Laterality Date    CHEST WALL RECONSTRUCTION      WISDOM TOOTH EXTRACTION       History reviewed. No pertinent family history.  Social History   Social History     Substance and Sexual Activity   Alcohol Use Never     Social History     Substance and Sexual Activity   Drug Use Yes    Types: Marijuana, Methamphetamines     Social History     Tobacco Use   Smoking Status Former    Current packs/day: 0.00    Average packs/day: 1 pack/day for 22.8 years (22.8 ttl pk-yrs)    Types: Cigarettes    Start date: 2001    Quit date: 10/2/2023    Years since quittin.2   Smokeless Tobacco Never     E-Cigarette/Vaping    E-Cigarette " "Use Never User      E-Cigarette/Vaping Substances    Nicotine No     THC No     CBD No     Flavoring No     Other No     Unknown No        Meds/Allergies     Current Outpatient Medications:     albuterol (PROVENTIL HFA,VENTOLIN HFA) 90 mcg/act inhaler, if needed for wheezing or shortness of breath, Disp: , Rfl:     atorvastatin (LIPITOR) 10 mg tablet, Take 10 mg by mouth daily, Disp: , Rfl:     QUEtiapine (SEROquel) 100 mg tablet, Take 100 mg by mouth daily at bedtime, Disp: , Rfl:     buPROPion (WELLBUTRIN XL) 300 mg 24 hr tablet, Take 300 mg by mouth in the morning, Disp: , Rfl:   No Known Allergies    Vitals: Blood pressure 112/62, pulse 55, temperature (!) 97.1 °F (36.2 °C), temperature source Tympanic, height 5' 10\" (1.778 m), weight 57.3 kg (126 lb 6.4 oz), SpO2 99%. Body mass index is 18.14 kg/m². Oxygen Therapy  SpO2: 99 %      Physical Exam  Vitals reviewed.   Constitutional:       Appearance: Normal appearance.   HENT:      Head: Normocephalic.      Nose: Nose normal.      Mouth/Throat:      Mouth: Mucous membranes are moist.      Pharynx: Oropharynx is clear.   Cardiovascular:      Rate and Rhythm: Normal rate and regular rhythm.      Pulses: Normal pulses.      Heart sounds: Normal heart sounds.   Pulmonary:      Effort: Pulmonary effort is normal.      Breath sounds: Normal breath sounds.   Musculoskeletal:         General: Normal range of motion.   Skin:     General: Skin is warm.      Capillary Refill: Capillary refill takes less than 2 seconds.   Neurological:      General: No focal deficit present.      Mental Status: He is alert and oriented to person, place, and time.   Psychiatric:         Mood and Affect: Mood normal.         Behavior: Behavior normal.         Labs:   Lab Results   Component Value Date    WBC 6.59 11/29/2024    HGB 12.7 11/29/2024    HCT 37.5 11/29/2024    MCV 93 11/29/2024     11/29/2024    EOSPCT 6 11/27/2024    EOSABS 0.35 11/27/2024    MONOPCT 12 11/27/2024     Lab " "Results   Component Value Date    CALCIUM 8.7 11/29/2024    K 4.2 11/29/2024    CO2 29 11/29/2024     11/29/2024    BUN 18 11/29/2024    CREATININE 0.83 11/29/2024     No results found for: \"IGE\", \"RAST\"  Lab Results   Component Value Date    ALT 14 03/20/2023    AST 16 03/20/2023    ALKPHOS 76 03/20/2023         Imaging and other studies: Results Review Statement: I reviewed radiology reports from this admission including: chest xray.  CXR 11/27/24  Moderate left pneumothorax.     CXR 11/30/24  Stable small left apical pneumothorax.         CLAUDIO Sellers  [facility]        Portions of the record may have been created with voice recognition software.  Occasional wrong word or \"sound a like\" substitutions may have occurred due to the inherent limitations of voice recognition software.  Read the chart carefully and recognize, using context, where substitutions have occurred or contact the dictating provider.  "

## 2024-12-13 NOTE — ASSESSMENT & PLAN NOTE
No further respiratory symptoms and on exam his chest is clear.  I recommend he have the repeat CXR to ensure clearing of the pneumothorax. It was ordered at a prior visit and he is asked to go today for that image. He will be contacted with results.    We did review warning signs: shortness of breath, cough, pressure in the chest, elevated HR, low oxygen. If any of these rapidly develop he will go to the ER.    I did review with Frank that smoking or vaping can increase the risk for pneumothorax to recur. Rapid atmospheric pressure changes can also increase the risk - scuba diving, flying, high altitude. These are controllable risk factors.

## 2024-12-13 NOTE — ASSESSMENT & PLAN NOTE
He has had a prior diagnosis of sleep apnea. He was not able to tolerate CPAP - mask issues and sensory intolerance.  We discussed a dental mandibular advancement device and he is interested.  I have asked him to supply the results of his prior sleep study.

## 2025-02-17 ENCOUNTER — APPOINTMENT (OUTPATIENT)
Dept: URGENT CARE | Facility: CLINIC | Age: 40
End: 2025-02-17

## 2025-02-17 ENCOUNTER — APPOINTMENT (OUTPATIENT)
Dept: PHYSICAL THERAPY | Facility: CLINIC | Age: 40
End: 2025-02-17

## 2025-02-17 PROCEDURE — 97530 THERAPEUTIC ACTIVITIES: CPT | Performed by: PHYSICAL THERAPIST

## 2025-03-06 ENCOUNTER — OFFICE VISIT (OUTPATIENT)
Dept: CARDIOLOGY CLINIC | Facility: CLINIC | Age: 40
End: 2025-03-06
Payer: COMMERCIAL

## 2025-03-06 VITALS
OXYGEN SATURATION: 99 % | BODY MASS INDEX: 17.79 KG/M2 | DIASTOLIC BLOOD PRESSURE: 68 MMHG | SYSTOLIC BLOOD PRESSURE: 124 MMHG | WEIGHT: 124 LBS | HEART RATE: 61 BPM

## 2025-03-06 DIAGNOSIS — E78.2 MIXED HYPERLIPIDEMIA: ICD-10-CM

## 2025-03-06 DIAGNOSIS — I49.3 PVCS (PREMATURE VENTRICULAR CONTRACTIONS): ICD-10-CM

## 2025-03-06 DIAGNOSIS — Z82.49 FAMILY HISTORY OF HEART DISEASE: ICD-10-CM

## 2025-03-06 DIAGNOSIS — I25.10 CORONARY ARTERY DISEASE INVOLVING NATIVE CORONARY ARTERY OF NATIVE HEART WITHOUT ANGINA PECTORIS: Primary | ICD-10-CM

## 2025-03-06 DIAGNOSIS — R93.1 AGATSTON CORONARY ARTERY CALCIUM SCORE BETWEEN 200 AND 399: ICD-10-CM

## 2025-03-06 DIAGNOSIS — Z82.41 FAMILY HISTORY OF SUDDEN CARDIAC DEATH: ICD-10-CM

## 2025-03-06 DIAGNOSIS — R07.2 PRECORDIAL PAIN: ICD-10-CM

## 2025-03-06 PROCEDURE — 99214 OFFICE O/P EST MOD 30 MIN: CPT | Performed by: INTERNAL MEDICINE

## 2025-03-06 RX ORDER — BUPROPION HYDROCHLORIDE 300 MG/1
TABLET ORAL
COMMUNITY
Start: 2025-03-04

## 2025-03-06 RX ORDER — SERTRALINE HYDROCHLORIDE 25 MG/1
1 TABLET, FILM COATED ORAL DAILY
COMMUNITY
Start: 2025-01-28

## 2025-03-06 NOTE — PATIENT INSTRUCTIONS
You were seen today in the Cardiology office for follow up evaluation.     Below is a summary of the recommendations based upon today's visit:    1. Dietary modifications - Follow a Mediterranean diet - one that is low in saturated fats (avoid red meat, dairy, cheeses), emphasize chicken, fish, turkey, tofu, vegetables, fruit (moderate quantities); also avoid simple carbs/starch/sugars, use whole wheat/grain/bran/oatmeal, snack on small quantities of nuts and fruits.     2. Exercise is very important. Ideally, AT LEAST four to five times weekly for 30 to 60 minutes per session - both cardiovascular and resistance work is OK. Listen to your body and rest when needed.    3. Continue all present medications. Start Aspirin 81mg once daily.     4. Testing prior to your next visit includes: blood work     5. Remember the ABCDEs to cardiovascular health for patients:  A: Awareness of cardiovascular symptoms  B: Blood pressure control  C: Cholesterol control  C: Cigarette avoidance  D: Diabetes control  D: Dietary choices  E: Exercise    6. Return in 12 months     Any testing ordered in office today will be available for patient review via [employer], and reviewed with me at the follow-up office visit as scheduled.    Thank you for choosing [facility].    Please call our office or use [employer] with any questions.

## 2025-03-06 NOTE — PROGRESS NOTES
[facility]    Name:Juan Antonio Cesar   DOS: 3/6/2025     Chief Complaint:   Chief Complaint   Patient presents with    Follow-up     No cardiac concerns.        HISTORY OF PRESENT ILLNESS:    HPI:  Juan Antonio Cesar is a 39 y.o. male. He  has a past medical history of Anxiety, Asthma, and Depression.    He presents for follow-up evaluation.  The patient last saw me in the office on 9/10/2024. He historically followed with the cardiology group at [facility] in the past. Review of prior records demonstrates that he had been evaluated there for palpitations, as well as hyperlipidemia given a family history of heart disease. He has been maintained on atorvastatin at varying doses, most recently a dose of 10 mg once daily.     At the last office visit, he presented with multiple cardiac complaints.  Per my prior office note:  he reports that he has been experiencing multiple cardiopulmonary symptoms that he would like to discuss.  The patient describes experiencing exertional chest pain particularly when he is at work lifting things or exerting himself.  He works at a steel mill/manufacturing plant, and his job often requires strenuous activity.  He reports no chest discomfort or pain at rest, and the episodes that do occur reliably relieved when he sits down to rest.  These episodes are associated with shortness of breath.  He has historically been evaluated for this complaint in the past, including an ER visit March 2023.  At that time, EKG demonstrated no evidence of ischemia and a single high-sensitivity troponin was within normal limits.     As a separate issue, he reports that he has been experiencing episodic palpitations, described as single flutters in his chest.  Much less frequent now than they have been in the past.  Often precipitated by anxiety that he is self aware of.    Based upon his presentation at that time, I referred the patient for a cardiac CTA to exclude flow-limiting  coronary disease.  Additionally, I had sent him for a transthoracic echocardiogram, lipoprotein a level.    His echocardiogram demonstrated preserved biventricular size and systolic function, with an EF of 60%.  No clinically significant valvular abnormalities were noted.  He did not have the lipoprotein a level drawn.      His cardiac CTA demonstrated a coronary calcium score of 309, with no clinically significant occlusive coronary artery disease.  No extracardiac findings were reported in the study.    Since he last saw me in the office, he has been hospitalized for spontaneous pneumothorax requiring chest tube placement in November 2024.    Today, he has no new cardiopulmonary complaints.  He specifically denies chest pain, shortness of breath, diaphoresis, dizziness, palpitations, orthopnea, edema.  He has had no syncopal episodes.       ROS    ROS: Pertinent positives and negatives as described in History of Present Illness. Remainder of a 14 point review of systems was negative.     No Known Allergies     Current Outpatient Medications on File Prior to Visit   Medication Sig Dispense Refill    albuterol (PROVENTIL HFA,VENTOLIN HFA) 90 mcg/act inhaler if needed for wheezing or shortness of breath      amphetamine-dextroamphetamine (ADDERALL XR) 15 MG 24 hr capsule Take 15 mg by mouth every morning      amphetamine-dextroamphetamine (ADDERALL XR) 25 MG 24 hr capsule Take 25 mg by mouth every morning      amphetamine-dextroamphetamine (ADDERALL XR) 30 MG 24 hr capsule Take 30 mg by mouth every morning      atorvastatin (LIPITOR) 10 mg tablet Take 10 mg by mouth daily      escitalopram (LEXAPRO) 5 mg tablet Take 5 mg by mouth daily at bedtime      QUEtiapine (SEROquel) 100 mg tablet Take 100 mg by mouth daily at bedtime       No current facility-administered medications on file prior to visit.       Past Medical History:   Diagnosis Date    Anxiety     Asthma     Depression        Past Surgical History:    Procedure Laterality Date    CHEST WALL RECONSTRUCTION      WISDOM TOOTH EXTRACTION         No family history on file.    Social History     Socioeconomic History    Marital status: Single     Spouse name: Not on file    Number of children: Not on file    Years of education: Not on file    Highest education level: Not on file   Occupational History    Not on file   Tobacco Use    Smoking status: Former     Current packs/day: 0.00     Average packs/day: 1 pack/day for 22.8 years (22.8 ttl pk-yrs)     Types: Cigarettes     Start date: 2001     Quit date: 10/2/2023     Years since quittin.4    Smokeless tobacco: Never   Vaping Use    Vaping status: Never Used   Substance and Sexual Activity    Alcohol use: Never    Drug use: Yes     Types: Marijuana, Methamphetamines    Sexual activity: Not on file   Other Topics Concern    Not on file   Social History Narrative    Not on file     Social Drivers of Health     Financial Resource Strain: Not on file   Food Insecurity: No Food Insecurity (2024)    Nursing - Inadequate Food Risk Classification     Worried About Running Out of Food in the Last Year: Not on file     Ran Out of Food in the Last Year: Not on file     Ran Out of Food in the Last Year: Never true   Transportation Needs: No Transportation Needs (2024)    Nursing - Transportation Risk Classification     Lack of Transportation: Not on file     Lack of Transportation: No   Physical Activity: Not on file   Stress: Not on file   Social Connections: Not on file   Intimate Partner Violence: Unknown (2024)    Nursing IPS     Feels Physically and Emotionally Safe: Not on file     Physically Hurt by Someone: Not on file     Humiliated or Emotionally Abused by Someone: Not on file     Physically Hurt by Someone: No     Hurt or Threatened by Someone: No   Housing Stability: Unknown (2024)    Nursing: Inadequate Housing Risk Classification     Has Housing: Not on file     Worried About Losing  Housing: Not on file     Unable to Get Utilities: Not on file     Unable to Pay for Housing in the Last Year: No     Has Housin       OBJECTIVE:    Wt 56.2 kg (124 lb)   BMI 17.79 kg/m²      BP Readings from Last 3 Encounters:   24 108/68   24 112/62   24 112/64       Wt Readings from Last 3 Encounters:   25 56.2 kg (124 lb)   24 55.8 kg (123 lb)   24 57.3 kg (126 lb 6.4 oz)         Physical Exam  Vitals reviewed.   Constitutional:       General: He is not in acute distress.     Appearance: Normal appearance. He is not diaphoretic.   HENT:      Head: Normocephalic and atraumatic.   Eyes:      Conjunctiva/sclera: Conjunctivae normal.   Neck:      Vascular: No JVD.   Cardiovascular:      Rate and Rhythm: Normal rate and regular rhythm.      Pulses: Normal pulses.      Heart sounds: Normal heart sounds. No murmur heard.     No friction rub. No gallop.   Pulmonary:      Effort: Pulmonary effort is normal.      Breath sounds: Normal breath sounds. No wheezing, rhonchi or rales.   Abdominal:      General: Abdomen is flat. Bowel sounds are normal.   Musculoskeletal:      Right lower leg: No edema.      Left lower leg: No edema.   Skin:     General: Skin is warm and dry.   Neurological:      General: No focal deficit present.      Mental Status: He is alert and oriented to person, place, and time.   Psychiatric:         Mood and Affect: Mood normal.         Behavior: Behavior normal.                                                             LABS:  Lab Results   Component Value Date    BUN 18 2024    CREATININE 0.83 2024    CALCIUM 8.7 2024    K 4.2 2024    CO2 29 2024     2024    ALKPHOS 76 2023    AST 16 2023    ALT 14 2023        Lab Results   Component Value Date    WBC 6.59 2024    HGB 12.7 2024    HCT 37.5 2024    MCV 93 2024     2024           Lab Results   Component Value Date     "HGBA1C 5.1 11/03/2023         ASSESSMENT/PLAN:  Diagnoses and all orders for this visit:    Coronary artery disease involving native coronary artery of native heart without angina pectoris    Agatston coronary artery calcium score between 200 and 399    Precordial pain    PVCs (premature ventricular contractions)    Mixed hyperlipidemia    Family history of heart disease    Family history of sudden cardiac death    Other orders  -     buPROPion (WELLBUTRIN XL) 300 mg 24 hr tablet  -     sertraline (ZOLOFT) 25 mg tablet; Take 1 tablet by mouth in the morning    39-year-old male presenting for follow-up evaluation.  He has no new cardiopulmonary complaints, he has not no recurrent chest pain.  Palpitation frequency is overall low compared to our last assessment.  He was recently started on Adderall by his psychiatrist, tolerating this therapy well with no increased palpitation frequency.  We reviewed the results of his cardiovascular testing including echocardiogram and cardiac CTA.  I personally and independently reviewed both studies in office today.  His echo demonstrates a structurally normal heart    His cardiac CTA has demonstrated greater than expected coronary calcium for his age.  I have recommended a goal LDL of 50-70.  He is taking atorvastatin 10 mg once daily and his most recent lipids from November 2023 demonstrated good control.  I have ordered a repeat lipid panel now to ensure that his lipids are stable.  I recommended 1 year follow-up.  I counseled him on the importance of abstinence from tobacco products and vaping.  Advised him to follow-up sooner for any symptom recurrence.    I recommended a primary prevention aspirin.       Cary Nava MD [facility]      Portions of the record may have been created with voice recognition software. Occasional wrong word or \"sound alike\" substitutions may have occurred due to the inherent limitations of voice recognition software. Please review the chart carefully " and recognize, using context, where substitutions/typographical errors may have occurred.